# Patient Record
Sex: FEMALE | Race: WHITE | Employment: FULL TIME | URBAN - METROPOLITAN AREA
[De-identification: names, ages, dates, MRNs, and addresses within clinical notes are randomized per-mention and may not be internally consistent; named-entity substitution may affect disease eponyms.]

---

## 2019-12-17 ENCOUNTER — HOSPITAL ENCOUNTER (OUTPATIENT)
Dept: LAB | Age: 49
Discharge: HOME | End: 2019-12-17
Payer: OTHER GOVERNMENT

## 2019-12-17 ENCOUNTER — HOSPITAL ENCOUNTER (OUTPATIENT)
Dept: ULTRASOUND IMAGING | Age: 49
Discharge: HOME | End: 2019-12-17
Attending: SURGERY

## 2019-12-17 DIAGNOSIS — G89.29 CHRONIC PAIN OF RIGHT ANKLE: ICD-10-CM

## 2019-12-17 DIAGNOSIS — M25.471 RIGHT ANKLE SWELLING: ICD-10-CM

## 2019-12-17 DIAGNOSIS — M25.571 CHRONIC PAIN OF RIGHT ANKLE: ICD-10-CM

## 2019-12-17 PROBLEM — E66.01 OBESITY, MORBID (HCC): Status: ACTIVE | Noted: 2019-12-17

## 2019-12-17 LAB
EST. AVERAGE GLUCOSE BLD GHB EST-MCNC: 111 MG/DL
HBA1C MFR BLD: 5.5 %
TSH SERPL DL<=0.005 MIU/L-ACNC: 6.83 UIU/ML

## 2019-12-17 PROCEDURE — 80323 ALKALOIDS NOS: CPT

## 2019-12-17 PROCEDURE — 84443 ASSAY THYROID STIM HORMONE: CPT

## 2019-12-17 PROCEDURE — 83036 HEMOGLOBIN GLYCOSYLATED A1C: CPT

## 2019-12-17 PROCEDURE — 36415 COLL VENOUS BLD VENIPUNCTURE: CPT

## 2019-12-17 PROCEDURE — 82652 VIT D 1 25-DIHYDROXY: CPT

## 2019-12-18 LAB — 1,25(OH)2D3 SERPL-MCNC: 25.4 PG/ML (ref 19.9–79.3)

## 2019-12-24 LAB
COTININE SERPL-MCNC: NORMAL NG/ML
NICOTINE SERPL-MCNC: NORMAL NG/ML

## 2019-12-26 PROBLEM — M79.671 RIGHT FOOT PAIN: Status: ACTIVE | Noted: 2019-12-26

## 2019-12-26 PROBLEM — F32.0 MILD MAJOR DEPRESSION (HCC): Status: ACTIVE | Noted: 2019-12-26

## 2019-12-26 PROBLEM — G25.81 RESTLESS LEG SYNDROME: Status: ACTIVE | Noted: 2019-12-26

## 2019-12-26 PROBLEM — E03.9 HYPOTHYROIDISM: Status: ACTIVE | Noted: 2019-12-26

## 2020-01-21 ENCOUNTER — HOSPITAL ENCOUNTER (OUTPATIENT)
Dept: NUTRITION | Age: 50
Discharge: HOME | End: 2020-01-21
Payer: OTHER GOVERNMENT

## 2020-01-21 PROCEDURE — 97802 MEDICAL NUTRITION INDIV IN: CPT

## 2020-01-21 NOTE — PROGRESS NOTES
SWL ASSESSMENT    Nutrition Assessment:  Anthropometrics:               Ht: 56, wt: 268#, 168% IBW, 42 BMI    Co-morbidities: OA    Labs: Bariatrics Program to complete lab work. Macronutrient needs assessment  EER: Objective measure of EEN difficult to assess 2nd rapid wt loss. Total energy requirements based on predictive and objective measures of the SWL pt inaccurate and are not likely to be met for up to 24 months postoperative. EPR: 76-114g protein/d based on 1.0g-1.5g/kg IBW or 60-120g of HBV protein to minimize losses of LBM   CHO: Avoid simple sugars; maintain consistent carbohydrate intake throughout the day  H2O: 48-64oz/day via regular, small amounts to maintain hydration. Support:  Pt has told mom, . Reminded pt about [facility] support group and online support group. Motivation:  High. In a lot of pain in joints. Pt has tried Atkins, juicing, counted calories to keep weight down while a swimmer-- all without any permanent weight loss. Eating Habits:   Eating occasions/d: Varies depending on if she works or is off of work. No consistent pattern of eating  Main cook at home: No one- pt elects self  Restaurant/Fast Food Intake: 1 delivery meal/week-chili's, chinese, pizza; out to eat at [id number] per week;   Typical Beverage Consumption: juice-orange, apple, cranberry, guave, grape; leo coconut water, diet fresca, diet gingerale, bottled water while at work. 2% milk  Food Allergies: Anjana  Cultural Preferences: none  Diet Recall: On work day:  4PM: lean cuisine meal, or husbands/moms food- corned beef and cabbage, spaghetti with meat sauce and italian bread and green beans, meatloaf bread corn on cob, loaded baked potatoes with fried okra; fresca (sguar free)  2-3AM: sandwich or zaxbys salad-extra cheese, ranch, 90 second meal, cup of soup, yogurt pretzels, block cheese, mustard pretzels/cheese/apple, yogurt, grapefruit slices. Water.     On non work day:  Eat throughout the day- usually 1 meal and snacking throughout the day. Larger portions. Leftovers from dinner. Doritos. Will eat eggs. 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, night shift              Activity during day: none              Type of Housing: Apartment                          Number of people living in house and influence: Mom and - mom turned her diabetes around through diet and exercise. Exercise Assessment:               Exercise equipment at home: None              Gym Membership: [employer]. Medical:                           Pain or injuries (past and present): R ankle, R rotator cuff, cl in back, bilateral him replacements, artificial knee/kneecap                           Past surgeries related to mobility: 11(see above)  Current Exercise Routine: Gets 58028 steps on fitbit 5 days per week, swim 1 day per week on Sunday. States she will not be able to exercise for 5 weeks after upcoming ankle surgery. Assessment Exercise Goal: Begin exercising minimum 30 mins per day, beginning at 3 days per week once cleared following ankle surgery. Nutrition Diagnosis:  Morbid obesity R/T yoyo dieting as evidenced by BMI = 42 and 168 % IBW.       Nutrition Intervention:    Discussion of Advanced or related topics: Bariatric Surgery Diet-                           A. 3 meals/d and macronutrients                          B. Pre vs post op diet --to be discussed in future appointments                          C. Protein-first Diet and protein supplements --to be discussed in future appointments                          D. Fluid Requirements; > 64 oz/day  non-carbonated, non-caffeinated                                 E. MVI requirements --to be discussed in future appointments                          F. Mindful/Intuitive Eating  *Pt verbalizes understanding of information provided during this session. Pt notes a history of emotional eating, not exercising, not eating 3 meals per day, had limited nutrition knowledge prior to appointment today. Did not know fruit was a carb, was drinking juice, etc. Encouraged pt to identify healthy coping activities other than eating, discussed order of eating for life following surgery (protein, NSV, smart carbohydrates). Encouraged pt to track meals. Nutrition Monitoring and Evaluation:    Follow for understanding of pre and post-operative nutrition requirements.  Monitor for safe, supervised weight loss appropriate for gastric sleeve.  Follow for meeting protein and fluid requirements. Impression:                 Readiness to learn and change: Fair              Comprehension level: Fair              Anticipated compliance: Fair    RD feels pt will be a good SWL candidate once nutrition/exercise habits are in place. Pt understands changes that must occur prior to surgery to support SWL tool. RD feels as though pt will be able to adhere to post-operative instructions and plan of care.            Echo Hughes RD, LD

## 2020-01-26 RX ORDER — CEFAZOLIN SODIUM/WATER 2 G/20 ML
2 SYRINGE (ML) INTRAVENOUS ONCE
Status: CANCELLED | OUTPATIENT
Start: 2020-01-26 | End: 2020-01-26

## 2020-01-28 ENCOUNTER — ANESTHESIA EVENT (OUTPATIENT)
Dept: SURGERY | Age: 50
End: 2020-01-28
Payer: OTHER GOVERNMENT

## 2020-01-29 ENCOUNTER — HOSPITAL ENCOUNTER (OUTPATIENT)
Age: 50
Setting detail: OUTPATIENT SURGERY
Discharge: HOME | End: 2020-01-29
Attending: ORTHOPAEDIC SURGERY | Admitting: ORTHOPAEDIC SURGERY
Payer: OTHER GOVERNMENT

## 2020-01-29 ENCOUNTER — APPOINTMENT (OUTPATIENT)
Dept: GENERAL RADIOLOGY | Age: 50
End: 2020-01-29
Attending: ORTHOPAEDIC SURGERY
Payer: OTHER GOVERNMENT

## 2020-01-29 ENCOUNTER — ANESTHESIA (OUTPATIENT)
Dept: SURGERY | Age: 50
End: 2020-01-29
Payer: OTHER GOVERNMENT

## 2020-01-29 VITALS
SYSTOLIC BLOOD PRESSURE: 145 MMHG | WEIGHT: 284 LBS | BODY MASS INDEX: 43.18 KG/M2 | RESPIRATION RATE: 16 BRPM | DIASTOLIC BLOOD PRESSURE: 76 MMHG | HEART RATE: 72 BPM | TEMPERATURE: 98 F | OXYGEN SATURATION: 100 %

## 2020-01-29 DIAGNOSIS — M87.071 ASEPTIC NECROSIS OF RIGHT TALUS (HCC): Primary | ICD-10-CM

## 2020-01-29 LAB — HGB BLD-MCNC: 11.3 G/DL (ref 11.7–15.4)

## 2020-01-29 PROCEDURE — 77030020275 HC MISC ORTHOPEDIC: Performed by: ORTHOPAEDIC SURGERY

## 2020-01-29 PROCEDURE — 76210000020 HC REC RM PH II FIRST 0.5 HR: Performed by: ORTHOPAEDIC SURGERY

## 2020-01-29 PROCEDURE — C1721 AICD, DUAL CHAMBER: HCPCS | Performed by: ORTHOPAEDIC SURGERY

## 2020-01-29 PROCEDURE — 74011250637 HC RX REV CODE- 250/637: Performed by: ANESTHESIOLOGY

## 2020-01-29 PROCEDURE — 77030037713 HC CLOSR DEV INCIS ZIP STRY -B: Performed by: ORTHOPAEDIC SURGERY

## 2020-01-29 PROCEDURE — 74011000250 HC RX REV CODE- 250: Performed by: NURSE ANESTHETIST, CERTIFIED REGISTERED

## 2020-01-29 PROCEDURE — 77030020274 HC MISC IMPL ORTHOPEDIC: Performed by: ORTHOPAEDIC SURGERY

## 2020-01-29 PROCEDURE — 77030003602 HC NDL NRV BLK BBMI -B: Performed by: ANESTHESIOLOGY

## 2020-01-29 PROCEDURE — 77030000032 HC CUF TRNQT ZIMM -B: Performed by: ORTHOPAEDIC SURGERY

## 2020-01-29 PROCEDURE — 74011000250 HC RX REV CODE- 250: Performed by: ORTHOPAEDIC SURGERY

## 2020-01-29 PROCEDURE — 76210000063 HC OR PH I REC FIRST 0.5 HR: Performed by: ORTHOPAEDIC SURGERY

## 2020-01-29 PROCEDURE — 77030018836 HC SOL IRR NACL ICUM -A: Performed by: ORTHOPAEDIC SURGERY

## 2020-01-29 PROCEDURE — 76942 ECHO GUIDE FOR BIOPSY: CPT | Performed by: ORTHOPAEDIC SURGERY

## 2020-01-29 PROCEDURE — 85018 HEMOGLOBIN: CPT

## 2020-01-29 PROCEDURE — C1713 ANCHOR/SCREW BN/BN,TIS/BN: HCPCS | Performed by: ORTHOPAEDIC SURGERY

## 2020-01-29 PROCEDURE — 76060000035 HC ANESTHESIA 2 TO 2.5 HR: Performed by: ORTHOPAEDIC SURGERY

## 2020-01-29 PROCEDURE — 77030025281 HC SPLNT ORTHGLS 1 BSNM -B: Performed by: ORTHOPAEDIC SURGERY

## 2020-01-29 PROCEDURE — 76010000162 HC OR TIME 1.5 TO 2 HR INTENSV-TIER 1: Performed by: ORTHOPAEDIC SURGERY

## 2020-01-29 PROCEDURE — 76010010054 HC POST OP PAIN BLOCK: Performed by: ORTHOPAEDIC SURGERY

## 2020-01-29 PROCEDURE — 74011250636 HC RX REV CODE- 250/636: Performed by: ORTHOPAEDIC SURGERY

## 2020-01-29 PROCEDURE — 77030013921: Performed by: ORTHOPAEDIC SURGERY

## 2020-01-29 PROCEDURE — 77030002933 HC SUT MCRYL J&J -A: Performed by: ORTHOPAEDIC SURGERY

## 2020-01-29 PROCEDURE — 74011250636 HC RX REV CODE- 250/636: Performed by: ANESTHESIOLOGY

## 2020-01-29 PROCEDURE — 77030003748: Performed by: ORTHOPAEDIC SURGERY

## 2020-01-29 PROCEDURE — 77030002982 HC SUT POLYSRB J&J -A: Performed by: ORTHOPAEDIC SURGERY

## 2020-01-29 PROCEDURE — 74011250636 HC RX REV CODE- 250/636: Performed by: NURSE ANESTHETIST, CERTIFIED REGISTERED

## 2020-01-29 DEVICE — IMPLANTABLE DEVICE: Type: IMPLANTABLE DEVICE | Site: FOOT | Status: FUNCTIONAL

## 2020-01-29 RX ORDER — CEFAZOLIN SODIUM/WATER 2 G/20 ML
3 SYRINGE (ML) INTRAVENOUS
Status: COMPLETED | OUTPATIENT
Start: 2020-01-29 | End: 2020-01-29

## 2020-01-29 RX ORDER — HEPARIN 100 UNIT/ML
SYRINGE INTRAVENOUS AS NEEDED
Status: DISCONTINUED | OUTPATIENT
Start: 2020-01-29 | End: 2020-01-29 | Stop reason: HOSPADM

## 2020-01-29 RX ORDER — HYDROMORPHONE HYDROCHLORIDE 2 MG/ML
0.5 INJECTION, SOLUTION INTRAMUSCULAR; INTRAVENOUS; SUBCUTANEOUS
Status: DISCONTINUED | OUTPATIENT
Start: 2020-01-29 | End: 2020-01-29 | Stop reason: HOSPADM

## 2020-01-29 RX ORDER — ROPIVACAINE HYDROCHLORIDE 5 MG/ML
INJECTION, SOLUTION EPIDURAL; INFILTRATION; PERINEURAL
Status: COMPLETED | OUTPATIENT
Start: 2020-01-29 | End: 2020-01-29

## 2020-01-29 RX ORDER — MIDAZOLAM HYDROCHLORIDE 1 MG/ML
2 INJECTION, SOLUTION INTRAMUSCULAR; INTRAVENOUS
Status: COMPLETED | OUTPATIENT
Start: 2020-01-29 | End: 2020-01-29

## 2020-01-29 RX ORDER — SODIUM CHLORIDE, SODIUM LACTATE, POTASSIUM CHLORIDE, CALCIUM CHLORIDE 600; 310; 30; 20 MG/100ML; MG/100ML; MG/100ML; MG/100ML
100 INJECTION, SOLUTION INTRAVENOUS CONTINUOUS
Status: DISCONTINUED | OUTPATIENT
Start: 2020-01-29 | End: 2020-01-29 | Stop reason: HOSPADM

## 2020-01-29 RX ORDER — SODIUM CHLORIDE 0.9 % (FLUSH) 0.9 %
5-40 SYRINGE (ML) INJECTION EVERY 8 HOURS
Status: DISCONTINUED | OUTPATIENT
Start: 2020-01-29 | End: 2020-01-29 | Stop reason: HOSPADM

## 2020-01-29 RX ORDER — FENTANYL CITRATE 50 UG/ML
100 INJECTION, SOLUTION INTRAMUSCULAR; INTRAVENOUS ONCE
Status: COMPLETED | OUTPATIENT
Start: 2020-01-29 | End: 2020-01-29

## 2020-01-29 RX ORDER — PROPOFOL 10 MG/ML
INJECTION, EMULSION INTRAVENOUS AS NEEDED
Status: DISCONTINUED | OUTPATIENT
Start: 2020-01-29 | End: 2020-01-29 | Stop reason: HOSPADM

## 2020-01-29 RX ORDER — DEXAMETHASONE SODIUM PHOSPHATE 4 MG/ML
INJECTION, SOLUTION INTRA-ARTICULAR; INTRALESIONAL; INTRAMUSCULAR; INTRAVENOUS; SOFT TISSUE AS NEEDED
Status: DISCONTINUED | OUTPATIENT
Start: 2020-01-29 | End: 2020-01-29 | Stop reason: HOSPADM

## 2020-01-29 RX ORDER — SCOLOPAMINE TRANSDERMAL SYSTEM 1 MG/1
1 PATCH, EXTENDED RELEASE TRANSDERMAL ONCE
Status: DISCONTINUED | OUTPATIENT
Start: 2020-01-29 | End: 2020-01-29 | Stop reason: HOSPADM

## 2020-01-29 RX ORDER — OXYCODONE HYDROCHLORIDE 5 MG/1
10 TABLET ORAL
Status: DISCONTINUED | OUTPATIENT
Start: 2020-01-29 | End: 2020-01-29 | Stop reason: HOSPADM

## 2020-01-29 RX ORDER — ONDANSETRON 2 MG/ML
INJECTION INTRAMUSCULAR; INTRAVENOUS AS NEEDED
Status: DISCONTINUED | OUTPATIENT
Start: 2020-01-29 | End: 2020-01-29 | Stop reason: HOSPADM

## 2020-01-29 RX ORDER — LIDOCAINE HYDROCHLORIDE 20 MG/ML
INJECTION, SOLUTION EPIDURAL; INFILTRATION; INTRACAUDAL; PERINEURAL AS NEEDED
Status: DISCONTINUED | OUTPATIENT
Start: 2020-01-29 | End: 2020-01-29 | Stop reason: HOSPADM

## 2020-01-29 RX ORDER — PROPOFOL 10 MG/ML
INJECTION, EMULSION INTRAVENOUS
Status: DISCONTINUED | OUTPATIENT
Start: 2020-01-29 | End: 2020-01-29 | Stop reason: HOSPADM

## 2020-01-29 RX ORDER — SODIUM CHLORIDE 0.9 % (FLUSH) 0.9 %
5-40 SYRINGE (ML) INJECTION AS NEEDED
Status: DISCONTINUED | OUTPATIENT
Start: 2020-01-29 | End: 2020-01-29 | Stop reason: HOSPADM

## 2020-01-29 RX ORDER — LIDOCAINE HYDROCHLORIDE 10 MG/ML
0.3 INJECTION INFILTRATION; PERINEURAL ONCE
Status: DISCONTINUED | OUTPATIENT
Start: 2020-01-29 | End: 2020-01-29 | Stop reason: HOSPADM

## 2020-01-29 RX ADMIN — MEPIVACAINE HYDROCHLORIDE 10 ML: 20 INJECTION, SOLUTION EPIDURAL; INFILTRATION at 07:40

## 2020-01-29 RX ADMIN — SODIUM CHLORIDE, SODIUM LACTATE, POTASSIUM CHLORIDE, AND CALCIUM CHLORIDE 100 ML/HR: 600; 310; 30; 20 INJECTION, SOLUTION INTRAVENOUS at 06:28

## 2020-01-29 RX ADMIN — DEXAMETHASONE SODIUM PHOSPHATE 10 MG: 4 INJECTION, SOLUTION INTRAMUSCULAR; INTRAVENOUS at 08:21

## 2020-01-29 RX ADMIN — PROPOFOL 40 MG: 10 INJECTION, EMULSION INTRAVENOUS at 08:18

## 2020-01-29 RX ADMIN — PROPOFOL 160 MCG/KG/MIN: 10 INJECTION, EMULSION INTRAVENOUS at 08:18

## 2020-01-29 RX ADMIN — Medication 3 G: at 08:21

## 2020-01-29 RX ADMIN — FENTANYL CITRATE 100 MCG: 50 INJECTION, SOLUTION INTRAMUSCULAR; INTRAVENOUS at 07:34

## 2020-01-29 RX ADMIN — ROPIVACAINE HYDROCHLORIDE 20 ML: 150 INJECTION, SOLUTION EPIDURAL; INFILTRATION; PERINEURAL at 07:40

## 2020-01-29 RX ADMIN — ONDANSETRON 4 MG: 2 INJECTION INTRAMUSCULAR; INTRAVENOUS at 10:06

## 2020-01-29 RX ADMIN — ROPIVACAINE HYDROCHLORIDE 10 ML: 150 INJECTION, SOLUTION EPIDURAL; INFILTRATION; PERINEURAL at 07:42

## 2020-01-29 RX ADMIN — MIDAZOLAM 2 MG: 1 INJECTION INTRAMUSCULAR; INTRAVENOUS at 07:34

## 2020-01-29 RX ADMIN — LIDOCAINE HYDROCHLORIDE 60 MG: 20 INJECTION, SOLUTION EPIDURAL; INFILTRATION; INTRACAUDAL; PERINEURAL at 08:13

## 2020-01-29 RX ADMIN — ONDANSETRON 4 MG: 2 INJECTION INTRAMUSCULAR; INTRAVENOUS at 08:18

## 2020-01-29 RX ADMIN — MEPIVACAINE HYDROCHLORIDE 10 ML: 20 INJECTION, SOLUTION EPIDURAL; INFILTRATION at 07:42

## 2020-01-29 NOTE — ANESTHESIA POSTPROCEDURE EVALUATION
Procedure(s):  RIGHT ACHILLES LENGTHENING  RIGHT ANKLE AND SUBTALAR FUSIONS W/ BONE MARROW ASPIRATE.    total IV anesthesia    Anesthesia Post Evaluation      Multimodal analgesia: multimodal analgesia used between 6 hours prior to anesthesia start to PACU discharge  Patient location during evaluation: PACU  Patient participation: complete - patient participated  Level of consciousness: awake and alert  Pain management: adequate  Airway patency: patent  Anesthetic complications: no  Cardiovascular status: acceptable  Respiratory status: acceptable  Hydration status: acceptable  Post anesthesia nausea and vomiting:  none      Vitals Value Taken Time   /74 1/29/2020 10:30 AM   Temp 36.4 °C (97.5 °F) 1/29/2020 10:13 AM   Pulse 67 1/29/2020 10:33 AM   Resp 16 1/29/2020 10:25 AM   SpO2 94 % 1/29/2020 10:33 AM   Vitals shown include unvalidated device data.

## 2020-01-29 NOTE — ANESTHESIA PREPROCEDURE EVALUATION
Relevant Problems   No relevant active problems       Anesthetic History     PONV     Comments: Bradycardia with anesthesia     Review of Systems / Medical History  Patient summary reviewed and pertinent labs reviewed    Pulmonary                   Neuro/Psych         Psychiatric history     Cardiovascular                  Exercise tolerance: >4 METS     GI/Hepatic/Renal           PUD (history)     Endo/Other        Morbid obesity     Other Findings              Physical Exam    Airway  Mallampati: II  TM Distance: 4 - 6 cm  Neck ROM: normal range of motion   Mouth opening: Normal     Cardiovascular    Rhythm: regular  Rate: normal         Dental  No notable dental hx       Pulmonary  Breath sounds clear to auscultation               Abdominal         Other Findings            Anesthetic Plan    ASA: 3  Anesthesia type: total IV anesthesia      Post-op pain plan if not by surgeon: peripheral nerve block single    Induction: Intravenous  Anesthetic plan and risks discussed with: Patient and Family

## 2020-01-29 NOTE — ANESTHESIA PROCEDURE NOTES
Popliteal block with ultrasound    Start time: 1/29/2020 7:34 AM  End time: 1/29/2020 7:40 AM  Performed by: Nayana Vela MD  Authorized by: Nayana Vela MD       Pre-procedure:    Indications: at surgeon's request and post-op pain management    Preanesthetic Checklist: patient identified, risks and benefits discussed, site marked, timeout performed, anesthesia consent given and patient being monitored    Timeout Time: 07:34          Block Type:   Block Type:  Popliteal  Laterality:  Right  Monitoring:  Continuous pulse ox, frequent vital sign checks, heart rate, oxygen and responsive to questions  Injection Technique:  Single shot  Procedures: ultrasound guided    Prep: chlorhexidine    Location:  Lower thigh  Needle Type:  Stimuplex  Needle Gauge:  20 G  Needle Localization:  Ultrasound guidance    Assessment:  Number of attempts:  1  Injection Assessment:  Incremental injection every 5 mL, local visualized surrounding nerve on ultrasound, negative aspiration for blood, no intravascular symptoms, no paresthesia and ultrasound image on chart  Patient tolerance:  Patient tolerated the procedure well with no immediate complications

## 2020-01-29 NOTE — ANESTHESIA PROCEDURE NOTES
Saphenous    Start time: 1/29/2020 7:40 AM  End time: 1/29/2020 7:42 AM  Performed by: Qing Partida MD  Authorized by: Qing Partida MD       Pre-procedure: Indications: at surgeon's request and post-op pain management    Preanesthetic Checklist: patient identified, risks and benefits discussed, site marked, timeout performed, anesthesia consent given and patient being monitored    Timeout Time: 07:40          Block Type:   Block Type:  Saphenous  Laterality:  Right  Monitoring:  Responsive to questions, oxygen, continuous pulse ox, frequent vital sign checks and heart rate  Injection Technique:  Single shot  Patient Position: supine  Prep: chlorhexidine    Location:  Cephalad tibia  Catheter size: 25g.   Needle Localization:  Anatomical landmarks and infiltration    Assessment:  Number of attempts:  1  Injection Assessment:  Negative aspiration for blood, no intravascular symptoms, no paresthesia and incremental injection every 5 mL  Patient tolerance:  Patient tolerated the procedure well with no immediate complications

## 2020-01-29 NOTE — DISCHARGE INSTRUCTIONS
INSTRUCTIONS FOLLOWING FOOT SURGERY    ACTIVITY  Elevate foot. No Ice  Let the office know if dressing gets saturated with water. No weight bearing. Use crutches or knee walker until seen in follow up appointment. Can use wheelchair until knee walker comes in tomorrow. Don't put anything into the splint to relieve itching etc.   Take one 325mg aspirin daily if okay with your medical doctor and you have no GI ulcer. Get up and out of bed frequently. While in bed move the legs as much as possible. DRESSING CARE Keep dry and in place until follow up appointment      DIET  Day of Surgery: Clear liquids until no nausea or vomiting; then light, bland diet (Baked chicken, plain rice, grits, scrambled egg, toast). Nothing Greasy, fried or spicy today  Advance to regular diet on second day, unless your doctor orders otherwise. PAIN  Take pain medications as directed by your doctor. Call your doctor if pain is NOT relieved by medication. PAIN MED SIDE EFFECTS  Constipation: Lots of fluids, try prune juice, then OTC stool softeners if necessary  Nausea: Take medication with food. CALL YOUR DOCTOR IF YOU HAVE  Excessive bleeding that does not stop after holding mild pressure over the area. Temperature of 101 degrees or above. Redness, excessive swelling or bruising, and/or green or yellow, smelly discharge from incision. Loss of sensation - cold, white or blue toes. AFTER ANESTHESIA  For the first 24 hours and while taking narcotics for pain: DO NOT Drive, Drink Alcoholic beverages, or make important Decisions. Be aware of dizziness following anesthesia and while taking pain medication. Preventing Infection at Home  We care about preventing infection and avoiding the spread of germs - not only when you are in the hospital but also when you return home.  When you return home from the hospital, its important to take the following steps to help prevent infection and avoid spreading germs that could infect you and others. Ask everyone in your home to follow these guidelines, too. Clean Your Hands  · Clean your hands whenever your hands are visibly dirty, before you eat, before or after touching your mouth, nose or eyes, and before preparing food. Clean them after contact with body fluids, using the restroom, touching animals or changing diapers. · When washing hands, wet them with warm water and work up a lather. Rub hands for at least 15 seconds, then rinse them and pat them dry with a clean towel or paper towel. · When using hand sanitizers, it should take about 15 seconds to rub your hands dry. If not, you probably didnt apply enough . Cover Your Sneeze or Cough  Germs are released into the air whenever you sneeze or cough. To prevent the spread of infection:  · Turn away from other people before coughing or sneezing. · Cover your mouth or nose with a tissue when you cough or sneeze. Put the tissue in the trash. · If you dont have a tissue, cough or sneeze into your upper sleeve, not your hands. · Always clean your hands after coughing or sneezing. Care for Wounds  Your skin is your bodys first line of defense against germs, but an open wound leaves an easy way for germs to enter your body. To prevent infection:  · Clean your hands before and after changing wound dressings, and wear gloves to change dressings if recommended by your doctor. · Take special care with IV lines or other devices inserted into the body. If you must touch them, clean your hands first.  · Follow any specific instructions from your doctor to care for your wounds. Contact your doctor if you experience any signs of infection, such as fever or increased redness at the surgical or wound site. Keep a Clean Home  · Clean or wipe commonly touched hard surfaces like door handles, sinks, tabletops, phones and TV remotes. · Use products labeled disinfectant to kill harmful bacteria and viruses.   · Use a clean cloth or paper towel to clean and dry surfaces. Wiping surfaces with a dirty dishcloth, sponge or towel will only spread germs. · Never share toothbrushes, sood, drinking glasses, utensils, razor blades, face cloths or bath towels to avoid spreading germs. · Be sure that the linens that you sleep on are clean. · Keep pets away from wounds and wash your hands after touching pets, their toys or bedding. We care about you and your health. Remember, preventing infections is a team effort between you, your family, friends and health care providers. DISCHARGE SUMMARY from Nurse    PATIENT INSTRUCTIONS:    After general anesthesia or intravenous sedation, for 24 hours or while taking prescription Narcotics:  · Limit your activities  · Do not drive and operate hazardous machinery  · Do not make important personal or business decisions  · Do  not drink alcoholic beverages  · If you have not urinated within 8 hours after discharge, please contact your surgeon on call. *  Please give a list of your current medications to your Primary Care Provider. *  Please update this list whenever your medications are discontinued, doses are      changed, or new medications (including over-the-counter products) are added. *  Please carry medication information at all times in case of emergency situations. These are general instructions for a healthy lifestyle:    No smoking/ No tobacco products/ Avoid exposure to second hand smoke    Surgeon General's Warning:  Quitting smoking now greatly reduces serious risk to your health.     Obesity, smoking, and sedentary lifestyle greatly increases your risk for illness    A healthy diet, regular physical exercise & weight monitoring are important for maintaining a healthy lifestyle    You may be retaining fluid if you have a history of heart failure or if you experience any of the following symptoms:  Weight gain of 3 pounds or more overnight or 5 pounds in a week, increased swelling in our hands or feet or shortness of breath while lying flat in bed. Please call your doctor as soon as you notice any of these symptoms; do not wait until your next office visit. Recognize signs and symptoms of STROKE:    F-face looks uneven    A-arms unable to move or move unevenly    S-speech slurred or non-existent    T-time-call 911 as soon as signs and symptoms begin-DO NOT go       Back to bed or wait to see if you get better-TIME IS BRAIN.

## 2020-01-30 RX ORDER — HYDROMORPHONE HYDROCHLORIDE 2 MG/1
2 TABLET ORAL
Qty: 20 TAB | Refills: 0 | Status: SHIPPED | OUTPATIENT
Start: 2020-01-30 | End: 2020-02-02

## 2020-01-30 NOTE — OP NOTES
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 PROCEDURES PERFORMED:  1. Right triple hemisection Achilles lengthening, 64372.  2.  Right open ankle arthrodesis, 19090.  3.  Right subtalar joint arthrodesis, 81382.  4.  Right calcaneal autograft harvest with bone marrow aspirate, . SURGEON:  Gilbert Collet, MD        ANESTHESIA:  Popliteal block with monitored anesthesia care. ESTIMATED BLOOD LOSS:  Minimal.    TOURNIQUET TIME:  80 minutes at 250 mmHg. ANTIBIOTIC PROPHYLAXIS:  Ancef given prior to procedure. INDICATIONS FOR PROCEDURE:  The patient is a 55-year-old white female with severe varus hindfoot ankle arthritis and subtalar joint arthritis, status post previous talus fracture, who presents today for operative treatment. Risks and benefits of the procedure including, but not limited to, risk of anesthetic complications, myocardial infarction, stroke, and death; and surgical complications including damage to nerves and blood vessels, risk of infection, incomplete pain relief, risk of malunion, risk of nonunion, risk of lifetime bracing, and need for conversion later to ankle arthroplasty had been discussed with the patient. She understands the risks and wishes to proceed with surgery at this time. DETAILS OF PROCEDURE:  The patient's operative site was marked with indelible ink in the preop holding area. A block was placed by the Department of Anesthesia. The patient was brought to the operating room and placed supine.   After preop surgical time-out, the right lower extremity was identified as the surgical site, prepped and draped in a standard sterile fashion using ChloraPrep solution. A triple hemisection Achilles lengthening was performed through three small stab incisions of the heel. At that time, a bone marrow aspirate needle was then placed into the lateral calcaneal wall. Bone marrow aspirate was obtained and later spun down in a centrifuge and the stem cells were then used in both the subtalar and the tibiotalar joint. Lateral approach to the sinus tarsi was then performed at that time. Underlying subtalar joint was identified and prepared using a curette, followed by drill bit and osteotomes and bone marrow aspirate was placed at this time. The wounds were irrigated and closed using Monocryl and ZipLine sutures. An anterior approach to the ankle was then performed at that time, taking care to protect the neurovascular bundle. The underlying tibiotalar joint was identified and was prepared using a curette, followed by drill bit and osteotome. It was brought back into desired neutral alignment and an Arthrex anterior ankle fusion plate was affixed using appropriate length locking and nonlocking screws. The wounds were irrigated and closed using Monocryl and ZipLine sutures. A sterile dressing was then applied, followed by well-padded posterior splint. Anesthesia was discontinued. The patient was transferred back to the recovery bed and taken to the recovery room in satisfactory condition. She appeared to tolerate the procedure well. There were no apparent general or anesthetic complications. All needle and sponge counts correct.       MD DELLA Lucas/[email or web address]  D:  01/29/2020 21:08  T:  01/30/2020 3:38  JOB #:  [id number]

## 2020-02-11 ENCOUNTER — APPOINTMENT (OUTPATIENT)
Dept: NUTRITION | Age: 50
End: 2020-02-11

## 2020-03-30 ENCOUNTER — TELEPHONE (OUTPATIENT)
Dept: NUTRITION | Age: 50
End: 2020-03-30

## 2020-05-18 ENCOUNTER — HOSPITAL ENCOUNTER (OUTPATIENT)
Dept: GENERAL RADIOLOGY | Age: 50
Discharge: HOME | End: 2020-05-18
Attending: NURSE PRACTITIONER
Payer: OTHER GOVERNMENT

## 2020-05-18 DIAGNOSIS — Z87.19 H/O ESOPHAGEAL REFLUX: ICD-10-CM

## 2020-05-18 PROCEDURE — 74240 X-RAY XM UPR GI TRC 1CNTRST: CPT

## 2020-05-18 PROCEDURE — 74011000250 HC RX REV CODE- 250: Performed by: NURSE PRACTITIONER

## 2020-05-18 PROCEDURE — 74011000255 HC RX REV CODE- 255: Performed by: NURSE PRACTITIONER

## 2020-05-18 RX ADMIN — ANTACID/ANTIFLATULENT 4 G: 380; 550; 10; 10 GRANULE, EFFERVESCENT ORAL at 08:56

## 2020-05-18 RX ADMIN — BARIUM SULFATE 55 ML: 0.6 SUSPENSION ORAL at 08:56

## 2020-05-18 RX ADMIN — BARIUM SULFATE 135 ML: 980 POWDER, FOR SUSPENSION ORAL at 08:55

## 2020-06-15 NOTE — PROGRESS NOTES
Per Edwin David at Dr. Matt Us office- Pt lives in [facility] and had COVID-19 completed in [facility] last Wednesday. Pt instructed to bring paper copy of results for Manometry test tomorrow.

## 2020-06-16 ENCOUNTER — HOSPITAL ENCOUNTER (OUTPATIENT)
Age: 50
Setting detail: OUTPATIENT SURGERY
Discharge: HOME | End: 2020-06-16
Attending: SURGERY | Admitting: SURGERY
Payer: OTHER GOVERNMENT

## 2020-06-16 PROCEDURE — 74011000250 HC RX REV CODE- 250: Performed by: SURGERY

## 2020-06-16 PROCEDURE — 91010 ESOPHAGUS MOTILITY STUDY: CPT | Performed by: SURGERY

## 2020-06-16 RX ORDER — LIDOCAINE HYDROCHLORIDE 20 MG/ML
15 SOLUTION OROPHARYNGEAL AS NEEDED
Status: DISCONTINUED | OUTPATIENT
Start: 2020-06-16 | End: 2020-06-16 | Stop reason: HOSPADM

## 2020-06-16 RX ORDER — LORAZEPAM 1 MG/1
1 TABLET ORAL ONCE
Status: DISCONTINUED | OUTPATIENT
Start: 2020-06-16 | End: 2020-06-16

## 2020-06-16 RX ADMIN — LIDOCAINE HYDROCHLORIDE 15 ML: 20 SOLUTION ORAL; TOPICAL at 08:00

## 2020-06-16 NOTE — PROGRESS NOTES
Patient unable to pass probe down, attempted X 4. Staff had difficulty with the nasal intubation. When staff did get probe through the nasal cavity, blood noted in nostril. When staff proceeded to push probe past back of throat, patient started vomiting uncontrollably and asked staff to remove probe, that she could not do the test. Staff removed probe.  Blood noted to probe after removal. Will inform Dr. Bandar Díaz that patient did not complete test.

## 2020-06-17 ENCOUNTER — HOSPITAL ENCOUNTER (OUTPATIENT)
Dept: SURGERY | Age: 50
Discharge: HOME | End: 2020-06-17
Payer: OTHER GOVERNMENT

## 2020-06-17 VITALS
OXYGEN SATURATION: 97 % | HEIGHT: 67 IN | RESPIRATION RATE: 20 BRPM | TEMPERATURE: 98.1 F | WEIGHT: 293 LBS | BODY MASS INDEX: 45.99 KG/M2 | HEART RATE: 81 BPM | SYSTOLIC BLOOD PRESSURE: 155 MMHG | DIASTOLIC BLOOD PRESSURE: 74 MMHG

## 2020-06-17 LAB
ALBUMIN SERPL-MCNC: 3.8 G/DL (ref 3.5–5)
ALBUMIN/GLOB SERPL: 1 {RATIO} (ref 1.2–3.5)
ALP SERPL-CCNC: 115 U/L (ref 50–136)
ALT SERPL-CCNC: 30 U/L (ref 12–65)
ANION GAP SERPL CALC-SCNC: 7 MMOL/L (ref 7–16)
AST SERPL-CCNC: 21 U/L (ref 15–37)
BILIRUB SERPL-MCNC: 0.3 MG/DL (ref 0.2–1.1)
BUN SERPL-MCNC: 18 MG/DL (ref 6–23)
CALCIUM SERPL-MCNC: 8.9 MG/DL (ref 8.3–10.4)
CHLORIDE SERPL-SCNC: 107 MMOL/L (ref 98–107)
CO2 SERPL-SCNC: 25 MMOL/L (ref 21–32)
CREAT SERPL-MCNC: 0.99 MG/DL (ref 0.6–1)
ERYTHROCYTE [DISTWIDTH] IN BLOOD BY AUTOMATED COUNT: 16.5 % (ref 11.9–14.6)
EST. AVERAGE GLUCOSE BLD GHB EST-MCNC: 108 MG/DL
GLOBULIN SER CALC-MCNC: 4 G/DL (ref 2.3–3.5)
GLUCOSE SERPL-MCNC: 149 MG/DL (ref 65–100)
HBA1C MFR BLD: 5.4 %
HCT VFR BLD AUTO: 36.7 % (ref 35.8–46.3)
HGB BLD-MCNC: 11.1 G/DL (ref 11.7–15.4)
MCH RBC QN AUTO: 25.3 PG (ref 26.1–32.9)
MCHC RBC AUTO-ENTMCNC: 30.2 G/DL (ref 31.4–35)
MCV RBC AUTO: 83.6 FL (ref 79.6–97.8)
NRBC # BLD: 0 K/UL (ref 0–0.2)
PLATELET # BLD AUTO: 298 K/UL (ref 150–450)
PMV BLD AUTO: 10.2 FL (ref 9.4–12.3)
POTASSIUM SERPL-SCNC: 4 MMOL/L (ref 3.5–5.1)
PROT SERPL-MCNC: 7.8 G/DL (ref 6.3–8.2)
RBC # BLD AUTO: 4.39 M/UL (ref 4.05–5.2)
SODIUM SERPL-SCNC: 139 MMOL/L (ref 136–145)
WBC # BLD AUTO: 7.2 K/UL (ref 4.3–11.1)

## 2020-06-17 PROCEDURE — 36415 COLL VENOUS BLD VENIPUNCTURE: CPT

## 2020-06-17 PROCEDURE — 83036 HEMOGLOBIN GLYCOSYLATED A1C: CPT

## 2020-06-17 PROCEDURE — 85027 COMPLETE CBC AUTOMATED: CPT

## 2020-06-17 PROCEDURE — 80053 COMPREHEN METABOLIC PANEL: CPT

## 2020-06-17 NOTE — PERIOP NOTES
SURGICAL WEIGHT LOSS Enhanced Recovery After Surgery: diabetic and non-diabetic patients      The night before surgery 6/28/20, drink 1 bottles of the Ensure Pre-Surgery drink. The morning of surgery 6/29/20, drink 1/2 bottle of the Ensure Pre-Surgery drink while on your way to the hospital. Drink this over 5-10 minutes. Drink nothing else after drinking the pre-surgical drink the morning of surgery. Bring your patient handbook with you to the hospital.        Things to Remember:      1. You will be up in a chair the evening of surgery and sipping on clear liquids, once released by your surgeon. 2. Beginning the day of surgery, you will be out of the bed as able, once released by your hospital team. We encourage you to be sitting up in a chair, walking in the powers, doing exercises as advised by physical therapy, etc.       3. You will be given scheduled non-narcotic pain medication to help keep your pain under control. You will have stronger pain medication ordered for break through pain. 4. All of these measures are geared toward improving your overall surgical recovery and   decreasing the risk of complications.

## 2020-06-17 NOTE — PERIOP NOTES
Patient verified name and     Order for consent NOT found in EHR, unable to confirm case posting; patient verified. Type 2 surgery, PAT walk-in assessment complete. Labs per surgeon: cbc, cmp, hemoglobin a1c; results pending. T&S DOS; order signed and held in EHR. Labs per anesthesia protocol: All required lab work included in surgeon's orders. EKG: None per anesthesia protocol. Patient informed a Covid swab is required 7 days prior to surgery. The testing center is located at the [facility] [address] [place]. For questions or concerns the patient is advised to call [phone]. An appointment is required and the testing clinic is closed from  for lunch and on weekends. Patient states she will have her COVID swab completed in [facility]. Patient instructed to fax results to [phone]. Charge nurse to /[facility] approved surgical skin cleanser and instructions given per hospital policy. Patient provided with and instructed on educational handouts including Guide to Surgery, Pain Management, Hand Hygiene, Blood Transfusion Education, and [facility] Brochure. Patient answered medical/surgical history questions at their best of ability. All prior to admission medications documented in [facility]. Original medication prescription bottle NOT visualized during patient appointment. Patient instructed to hold all vitamins, supplements, herbals 7 days prior to surgery, NSAIDS/ASA 5 days prior to surgery, and Tylenol (Acetaminophen) 24 hours prior to surgery. Patient teach back successful and patient demonstrates knowledge of instructions.

## 2020-06-17 NOTE — PERIOP NOTES
PLEASE CONTINUE TAKING ALL PRESCRIPTION MEDICATIONS UP TO THE DAY OF SURGERY UNLESS OTHERWISE DIRECTED BELOW. DISCONTINUE all vitamins, herbals and supplements 7 days prior to surgery. DISCONTINUE Non-Steriodal Anti-Inflammatory (NSAIDS) such as Advil, Ibuprofen, and Aleve 5 days prior to surgery. Home Medications to HOLD      All vitamins, supplements, and herbals stop 7 days prior to surgery   All NSAIDs and Aspirin, such as Advil, Aleve, Ibuprofen, Diclofenac, Naproxen, etc. Stop 5 days prior to surgery. Hold Tylenol (Acetaminophen) 24 hours prior to surgery. Home Medications to take  the day of surgery   Oxycodone if needed, Aloha Brandon if needed         Comments   Please bring bottle Xtampza and incentive spirometer to the hospital on the day of surgery. Please do not bring home medications with you on the day of surgery unless otherwise directed by your nurse. If you are instructed to bring home medications, please give them to your nurse as they will be administered by the nursing staff. If you have any questions, please call [place] [phone] or [facility] [phone]. Copy of above instructions given to patient.

## 2020-06-17 NOTE — PERIOP NOTES
The below lab results are within anesthesia limits, no further action is required. Recent Results (from the past 12 hour(s))   CBC W/O DIFF    Collection Time: 06/17/20  8:50 AM   Result Value Ref Range    WBC 7.2 4.3 - 11.1 K/uL    RBC 4.39 4.05 - 5.2 M/uL    HGB 11.1 (L) 11.7 - 15.4 g/dL    HCT 36.7 35.8 - 46.3 %    MCV 83.6 79.6 - 97.8 FL    MCH 25.3 (L) 26.1 - 32.9 PG    MCHC 30.2 (L) 31.4 - 35.0 g/dL    RDW 16.5 (H) 11.9 - 14.6 %    PLATELET 279 706 - 623 K/uL    MPV 10.2 9.4 - 12.3 FL    ABSOLUTE NRBC 0.00 0.0 - 0.2 K/uL   METABOLIC PANEL, COMPREHENSIVE    Collection Time: 06/17/20  8:50 AM   Result Value Ref Range    Sodium 139 136 - 145 mmol/L    Potassium 4.0 3.5 - 5.1 mmol/L    Chloride 107 98 - 107 mmol/L    CO2 25 21 - 32 mmol/L    Anion gap 7 7 - 16 mmol/L    Glucose 149 (H) 65 - 100 mg/dL    BUN 18 6 - 23 MG/DL    Creatinine 0.99 0.6 - 1.0 MG/DL    GFR est AA >60 >60 ml/min/1.73m2    GFR est non-AA >60 >60 ml/min/1.73m2    Calcium 8.9 8.3 - 10.4 MG/DL    Bilirubin, total 0.3 0.2 - 1.1 MG/DL    ALT (SGPT) 30 12 - 65 U/L    AST (SGOT) 21 15 - 37 U/L    Alk.  phosphatase 115 50 - 136 U/L    Protein, total 7.8 6.3 - 8.2 g/dL    Albumin 3.8 3.5 - 5.0 g/dL    Globulin 4.0 (H) 2.3 - 3.5 g/dL    A-G Ratio 1.0 (L) 1.2 - 3.5     HEMOGLOBIN A1C WITH EAG    Collection Time: 06/17/20  8:53 AM   Result Value Ref Range    Hemoglobin A1c 5.4 %    Est. average glucose 108 mg/dL

## 2020-06-23 ENCOUNTER — HOSPITAL ENCOUNTER (OUTPATIENT)
Age: 50
Setting detail: OUTPATIENT SURGERY
Discharge: HOME | End: 2020-06-23
Attending: SURGERY | Admitting: SURGERY
Payer: OTHER GOVERNMENT

## 2020-06-23 PROCEDURE — 91010 ESOPHAGUS MOTILITY STUDY: CPT | Performed by: SURGERY

## 2020-06-23 PROCEDURE — 74011000250 HC RX REV CODE- 250: Performed by: SURGERY

## 2020-06-23 RX ORDER — LIDOCAINE HYDROCHLORIDE 20 MG/ML
15 SOLUTION OROPHARYNGEAL AS NEEDED
Status: DISCONTINUED | OUTPATIENT
Start: 2020-06-23 | End: 2020-06-23 | Stop reason: HOSPADM

## 2020-06-23 RX ORDER — LORAZEPAM 1 MG/1
1 TABLET ORAL ONCE
Status: DISCONTINUED | OUTPATIENT
Start: 2020-06-23 | End: 2020-06-23 | Stop reason: HOSPADM

## 2020-06-23 RX ADMIN — BENZOCAINE: 220 SPRAY, METERED PERIODONTAL at 12:50

## 2020-06-23 RX ADMIN — LIDOCAINE HYDROCHLORIDE 15 ML: 20 SOLUTION ORAL; TOPICAL at 11:51

## 2020-06-24 NOTE — PROCEDURES
[facility]  PROCEDURE NOTE    Name:  Vilma Chamberlain  MR#:  [id number]  :  1970  ACCOUNT #:  [de-identified]  DATE OF SERVICE:  2020    PREOPERATIVE DIAGNOSIS:  Bariatric surgery workup. POSTOPERATIVE DIAGNOSIS:  Grossly abnormal motility - see below. PROCEDURE PERFORMED:  High-resolution impedance manometry. SURGEON:  Morena Lazar. Kalie Neely MD    PROCEDURE:  After obtaining informed consent, the patient underwent nasal intubation. The lower esophageal sphincter was located between 38.4 and 41.3 cm. There was a 3.2 cm hiatal hernia. The lower esophageal sphincter pressure was virtually nonexistent, averaging around 5 mm with the IRP slightly higher. The motility on 9/10 wet swallows was virtually absent with DCI averaging 80. There was only one normal swallow out of 10 and the DCI was just slightly above normal limits at 533. Viscous and liquid swallows made complete transit in 30% of 10 swallows each. This was by impedance data. DISPOSITION:  This is a grossly abnormal study with no significant contractility of the body of the esophagus and a lower esophageal sphincter pressure well below normal.  There was a hiatal hernia present as well. DISPOSITION:  Further followup per Dr. Kristina Toussaint.     Kasie Manrique MD      [id number]  D:  2020 9:11  T:  2020 10:10  JOB #:  [id number]  CC:  MD Kristina Purdy MD
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
## 2020-06-26 NOTE — PERIOP NOTES
No COVID test results received via fax as yet. Procedure scheduled Monday, June 29th. Noted in \"media\" section of chart review- scanned in document from [place] dated 6/9/2020. Covid test results \"not detected\". However, 6/9/2020 test results > 10 days of scheduled procedure.

## 2020-06-27 NOTE — PERIOP NOTES
Patient stating COVID swab performed at [facility] in [facility] on 06/26 at [phone] stating she was told it would result in 2 days.

## 2020-06-28 ENCOUNTER — ANESTHESIA EVENT (OUTPATIENT)
Dept: SURGERY | Age: 50
DRG: 621 | End: 2020-06-28
Payer: OTHER GOVERNMENT

## 2020-06-29 ENCOUNTER — ANESTHESIA (OUTPATIENT)
Dept: SURGERY | Age: 50
DRG: 621 | End: 2020-06-29
Payer: OTHER GOVERNMENT

## 2020-06-29 ENCOUNTER — HOSPITAL ENCOUNTER (INPATIENT)
Age: 50
LOS: 2 days | Discharge: HOME | DRG: 621 | End: 2020-07-01
Attending: SURGERY | Admitting: SURGERY
Payer: OTHER GOVERNMENT

## 2020-06-29 DIAGNOSIS — E66.01 OBESITY, MORBID (HCC): ICD-10-CM

## 2020-06-29 LAB
ABO + RH BLD: NORMAL
BLOOD GROUP ANTIBODIES SERPL: NORMAL
HCG UR QL: NEGATIVE
SPECIMEN EXP DATE BLD: NORMAL

## 2020-06-29 PROCEDURE — 74011250636 HC RX REV CODE- 250/636: Performed by: ANESTHESIOLOGY

## 2020-06-29 PROCEDURE — 77030039425 HC BLD LARYNG TRULITE DISP TELE -A: Performed by: ANESTHESIOLOGY

## 2020-06-29 PROCEDURE — 74011250637 HC RX REV CODE- 250/637: Performed by: ANESTHESIOLOGY

## 2020-06-29 PROCEDURE — 43235 EGD DIAGNOSTIC BRUSH WASH: CPT | Performed by: SURGERY

## 2020-06-29 PROCEDURE — 74011000250 HC RX REV CODE- 250: Performed by: NURSE ANESTHETIST, CERTIFIED REGISTERED

## 2020-06-29 PROCEDURE — 43644 LAP GASTRIC BYPASS/ROUX-EN-Y: CPT | Performed by: SURGERY

## 2020-06-29 PROCEDURE — 77030008756 HC TU IRR SUC STRY -B: Performed by: SURGERY

## 2020-06-29 PROCEDURE — 74011250637 HC RX REV CODE- 250/637: Performed by: NURSE PRACTITIONER

## 2020-06-29 PROCEDURE — 77030031139 HC SUT VCRL2 J&J -A: Performed by: SURGERY

## 2020-06-29 PROCEDURE — 74011250636 HC RX REV CODE- 250/636: Performed by: NURSE ANESTHETIST, CERTIFIED REGISTERED

## 2020-06-29 PROCEDURE — 94760 N-INVAS EAR/PLS OXIMETRY 1: CPT

## 2020-06-29 PROCEDURE — 74011250636 HC RX REV CODE- 250/636: Performed by: NURSE PRACTITIONER

## 2020-06-29 PROCEDURE — 77030013252 HC APPL DUPLOSPRY BAXT -B: Performed by: SURGERY

## 2020-06-29 PROCEDURE — 77030034154 HC SHR COAG HARM ACE J&J -F: Performed by: SURGERY

## 2020-06-29 PROCEDURE — 74011250636 HC RX REV CODE- 250/636

## 2020-06-29 PROCEDURE — 77030040361 HC SLV COMPR DVT MDII -B: Performed by: SURGERY

## 2020-06-29 PROCEDURE — 77030034821 HC DEV ENDOSC DST ORVIL COVD -C: Performed by: SURGERY

## 2020-06-29 PROCEDURE — 74011000250 HC RX REV CODE- 250: Performed by: ANESTHESIOLOGY

## 2020-06-29 PROCEDURE — 76060000037 HC ANESTHESIA 3 TO 3.5 HR: Performed by: SURGERY

## 2020-06-29 PROCEDURE — 93005 ELECTROCARDIOGRAM TRACING: CPT | Performed by: ANESTHESIOLOGY

## 2020-06-29 PROCEDURE — 77030010286 HC STPLR ENDOSC COVD -D: Performed by: SURGERY

## 2020-06-29 PROCEDURE — 87635 SARS-COV-2 COVID-19 AMP PRB: CPT

## 2020-06-29 PROCEDURE — 77030003578 HC NDL INSUF VERES AMR -B: Performed by: SURGERY

## 2020-06-29 PROCEDURE — 77030008522 HC TBNG INSUF LAPRO STRY -B: Performed by: SURGERY

## 2020-06-29 PROCEDURE — 77030000038 HC TIP SCIS LAPSCP SURI -B: Performed by: SURGERY

## 2020-06-29 PROCEDURE — 77030013532 HC SEAL FBRN TISSL RDYTUSE 4ML BAXT -C: Performed by: SURGERY

## 2020-06-29 PROCEDURE — 76210000016 HC OR PH I REC 1 TO 1.5 HR: Performed by: SURGERY

## 2020-06-29 PROCEDURE — 77030008023 HC RELD SUT ENDOSC COVD -B: Performed by: SURGERY

## 2020-06-29 PROCEDURE — 77030009957 HC RELD ENDOSTCH COVD -C: Performed by: SURGERY

## 2020-06-29 PROCEDURE — 0BQT4ZZ REPAIR DIAPHRAGM, PERCUTANEOUS ENDOSCOPIC APPROACH: ICD-10-PCS | Performed by: SURGERY

## 2020-06-29 PROCEDURE — 36415 COLL VENOUS BLD VENIPUNCTURE: CPT

## 2020-06-29 PROCEDURE — 77030008606 HC TRCR ENDOSC KII AMR -B: Performed by: SURGERY

## 2020-06-29 PROCEDURE — 77030010507 HC ADH SKN DERMBND J&J -B: Performed by: SURGERY

## 2020-06-29 PROCEDURE — 77030002933 HC SUT MCRYL J&J -A: Performed by: SURGERY

## 2020-06-29 PROCEDURE — 0D164ZA BYPASS STOMACH TO JEJUNUM, PERCUTANEOUS ENDOSCOPIC APPROACH: ICD-10-PCS | Performed by: SURGERY

## 2020-06-29 PROCEDURE — 76010000172 HC OR TIME 2.5 TO 3 HR INTENSV-TIER 1: Performed by: SURGERY

## 2020-06-29 PROCEDURE — 77030027876 HC STPLR ENDOSC FLX PWR J&J -G1: Performed by: SURGERY

## 2020-06-29 PROCEDURE — 77030036554: Performed by: SURGERY

## 2020-06-29 PROCEDURE — 86900 BLOOD TYPING SEROLOGIC ABO: CPT

## 2020-06-29 PROCEDURE — 77030036732 HC RELD STPLR VASC J&J -F: Performed by: SURGERY

## 2020-06-29 PROCEDURE — 65270000029 HC RM PRIVATE

## 2020-06-29 PROCEDURE — 77030018836 HC SOL IRR NACL ICUM -A: Performed by: SURGERY

## 2020-06-29 PROCEDURE — 77030037088 HC TUBE ENDOTRACH ORAL NSL COVD-A: Performed by: ANESTHESIOLOGY

## 2020-06-29 PROCEDURE — 77030009968 HC RELD STPLR ENDOSC J&J -D: Performed by: SURGERY

## 2020-06-29 PROCEDURE — 81025 URINE PREGNANCY TEST: CPT

## 2020-06-29 PROCEDURE — 77030040922 HC BLNKT HYPOTHRM STRY -A: Performed by: ANESTHESIOLOGY

## 2020-06-29 PROCEDURE — 74011000250 HC RX REV CODE- 250: Performed by: SURGERY

## 2020-06-29 PROCEDURE — 77030008771 HC TU NG SALEM SUMP -A: Performed by: ANESTHESIOLOGY

## 2020-06-29 RX ORDER — DIPHENHYDRAMINE HYDROCHLORIDE 50 MG/ML
INJECTION, SOLUTION INTRAMUSCULAR; INTRAVENOUS AS NEEDED
Status: DISCONTINUED | OUTPATIENT
Start: 2020-06-29 | End: 2020-06-29 | Stop reason: HOSPADM

## 2020-06-29 RX ORDER — GABAPENTIN 100 MG/1
200 CAPSULE ORAL 2 TIMES DAILY
Status: DISCONTINUED | OUTPATIENT
Start: 2020-06-29 | End: 2020-07-01 | Stop reason: HOSPADM

## 2020-06-29 RX ORDER — OXYCODONE HYDROCHLORIDE 5 MG/1
5 TABLET ORAL
Status: DISCONTINUED | OUTPATIENT
Start: 2020-06-29 | End: 2020-06-30

## 2020-06-29 RX ORDER — HEPARIN SODIUM 5000 [USP'U]/ML
5000 INJECTION, SOLUTION INTRAVENOUS; SUBCUTANEOUS ONCE
Status: COMPLETED | OUTPATIENT
Start: 2020-06-29 | End: 2020-06-29

## 2020-06-29 RX ORDER — SUCRALFATE 1 G/1
1 TABLET ORAL 4 TIMES DAILY
Status: DISCONTINUED | OUTPATIENT
Start: 2020-06-29 | End: 2020-07-01 | Stop reason: HOSPADM

## 2020-06-29 RX ORDER — LIDOCAINE HYDROCHLORIDE ANHYDROUS AND DEXTROSE MONOHYDRATE .8; 5 G/100ML; G/100ML
INJECTION, SOLUTION INTRAVENOUS
Status: DISCONTINUED | OUTPATIENT
Start: 2020-06-29 | End: 2020-06-29 | Stop reason: HOSPADM

## 2020-06-29 RX ORDER — LIDOCAINE HYDROCHLORIDE 10 MG/ML
0.1 INJECTION INFILTRATION; PERINEURAL AS NEEDED
Status: DISCONTINUED | OUTPATIENT
Start: 2020-06-29 | End: 2020-06-29 | Stop reason: HOSPADM

## 2020-06-29 RX ORDER — SODIUM CHLORIDE, SODIUM LACTATE, POTASSIUM CHLORIDE, CALCIUM CHLORIDE 600; 310; 30; 20 MG/100ML; MG/100ML; MG/100ML; MG/100ML
25 INJECTION, SOLUTION INTRAVENOUS CONTINUOUS
Status: DISCONTINUED | OUTPATIENT
Start: 2020-06-29 | End: 2020-06-29 | Stop reason: HOSPADM

## 2020-06-29 RX ORDER — OXYCODONE HYDROCHLORIDE 5 MG/1
5 TABLET ORAL
Status: DISCONTINUED | OUTPATIENT
Start: 2020-06-29 | End: 2020-06-29 | Stop reason: HOSPADM

## 2020-06-29 RX ORDER — BUPIVACAINE HYDROCHLORIDE 5 MG/ML
INJECTION, SOLUTION EPIDURAL; INTRACAUDAL AS NEEDED
Status: DISCONTINUED | OUTPATIENT
Start: 2020-06-29 | End: 2020-06-29 | Stop reason: HOSPADM

## 2020-06-29 RX ORDER — NALOXONE HYDROCHLORIDE 0.4 MG/ML
0.4 INJECTION, SOLUTION INTRAMUSCULAR; INTRAVENOUS; SUBCUTANEOUS AS NEEDED
Status: DISCONTINUED | OUTPATIENT
Start: 2020-06-29 | End: 2020-07-01 | Stop reason: HOSPADM

## 2020-06-29 RX ORDER — SUCCINYLCHOLINE CHLORIDE 20 MG/ML
INJECTION INTRAMUSCULAR; INTRAVENOUS AS NEEDED
Status: DISCONTINUED | OUTPATIENT
Start: 2020-06-29 | End: 2020-06-29 | Stop reason: HOSPADM

## 2020-06-29 RX ORDER — DIPHENHYDRAMINE HYDROCHLORIDE 50 MG/ML
12.5 INJECTION, SOLUTION INTRAMUSCULAR; INTRAVENOUS
Status: DISCONTINUED | OUTPATIENT
Start: 2020-06-29 | End: 2020-07-01 | Stop reason: HOSPADM

## 2020-06-29 RX ORDER — ACETAMINOPHEN 10 MG/ML
1000 INJECTION, SOLUTION INTRAVENOUS EVERY 6 HOURS
Status: COMPLETED | OUTPATIENT
Start: 2020-06-29 | End: 2020-06-30

## 2020-06-29 RX ORDER — SODIUM CHLORIDE, SODIUM LACTATE, POTASSIUM CHLORIDE, CALCIUM CHLORIDE 600; 310; 30; 20 MG/100ML; MG/100ML; MG/100ML; MG/100ML
75 INJECTION, SOLUTION INTRAVENOUS CONTINUOUS
Status: DISPENSED | OUTPATIENT
Start: 2020-06-29 | End: 2020-06-30

## 2020-06-29 RX ORDER — ONDANSETRON 2 MG/ML
4 INJECTION INTRAMUSCULAR; INTRAVENOUS
Status: DISCONTINUED | OUTPATIENT
Start: 2020-06-29 | End: 2020-06-29 | Stop reason: HOSPADM

## 2020-06-29 RX ORDER — SODIUM CHLORIDE 9 MG/ML
INJECTION, SOLUTION INTRAVENOUS
Status: DISCONTINUED | OUTPATIENT
Start: 2020-06-29 | End: 2020-06-29 | Stop reason: HOSPADM

## 2020-06-29 RX ORDER — PROPOFOL 10 MG/ML
VIAL (ML) INTRAVENOUS
Status: DISCONTINUED | OUTPATIENT
Start: 2020-06-29 | End: 2020-06-29 | Stop reason: HOSPADM

## 2020-06-29 RX ORDER — BACITRACIN 50000 [IU]/1
INJECTION, POWDER, FOR SOLUTION INTRAMUSCULAR AS NEEDED
Status: DISCONTINUED | OUTPATIENT
Start: 2020-06-29 | End: 2020-06-29 | Stop reason: HOSPADM

## 2020-06-29 RX ORDER — LIDOCAINE HYDROCHLORIDE 20 MG/ML
INJECTION, SOLUTION EPIDURAL; INFILTRATION; INTRACAUDAL; PERINEURAL AS NEEDED
Status: DISCONTINUED | OUTPATIENT
Start: 2020-06-29 | End: 2020-06-29 | Stop reason: HOSPADM

## 2020-06-29 RX ORDER — SCOLOPAMINE TRANSDERMAL SYSTEM 1 MG/1
1 PATCH, EXTENDED RELEASE TRANSDERMAL ONCE
Status: DISCONTINUED | OUTPATIENT
Start: 2020-06-29 | End: 2020-06-29 | Stop reason: SDUPTHER

## 2020-06-29 RX ORDER — ALBUTEROL SULFATE 0.83 MG/ML
2.5 SOLUTION RESPIRATORY (INHALATION) AS NEEDED
Status: DISCONTINUED | OUTPATIENT
Start: 2020-06-29 | End: 2020-06-29 | Stop reason: HOSPADM

## 2020-06-29 RX ORDER — PROPOFOL 10 MG/ML
INJECTION, EMULSION INTRAVENOUS AS NEEDED
Status: DISCONTINUED | OUTPATIENT
Start: 2020-06-29 | End: 2020-06-29 | Stop reason: HOSPADM

## 2020-06-29 RX ORDER — ONDANSETRON 2 MG/ML
4 INJECTION INTRAMUSCULAR; INTRAVENOUS EVERY 4 HOURS
Status: DISCONTINUED | OUTPATIENT
Start: 2020-06-29 | End: 2020-07-01 | Stop reason: HOSPADM

## 2020-06-29 RX ORDER — MIDAZOLAM HYDROCHLORIDE 1 MG/ML
2 INJECTION, SOLUTION INTRAMUSCULAR; INTRAVENOUS
Status: COMPLETED | OUTPATIENT
Start: 2020-06-29 | End: 2020-06-29

## 2020-06-29 RX ORDER — EPHEDRINE SULFATE/0.9% NACL/PF 50 MG/5 ML
SYRINGE (ML) INTRAVENOUS AS NEEDED
Status: DISCONTINUED | OUTPATIENT
Start: 2020-06-29 | End: 2020-06-29 | Stop reason: HOSPADM

## 2020-06-29 RX ORDER — APREPITANT 40 MG/1
40 CAPSULE ORAL ONCE
Status: COMPLETED | OUTPATIENT
Start: 2020-06-29 | End: 2020-06-29

## 2020-06-29 RX ORDER — HYDROMORPHONE HYDROCHLORIDE 2 MG/ML
0.5 INJECTION, SOLUTION INTRAMUSCULAR; INTRAVENOUS; SUBCUTANEOUS
Status: DISCONTINUED | OUTPATIENT
Start: 2020-06-29 | End: 2020-06-29 | Stop reason: HOSPADM

## 2020-06-29 RX ORDER — ACETAMINOPHEN 500 MG
1000 TABLET ORAL EVERY 6 HOURS
Status: DISCONTINUED | OUTPATIENT
Start: 2020-06-30 | End: 2020-07-01 | Stop reason: HOSPADM

## 2020-06-29 RX ORDER — PANTOPRAZOLE SODIUM 40 MG/10ML
40 INJECTION, POWDER, LYOPHILIZED, FOR SOLUTION INTRAVENOUS DAILY
Status: DISCONTINUED | OUTPATIENT
Start: 2020-06-30 | End: 2020-07-01 | Stop reason: HOSPADM

## 2020-06-29 RX ORDER — KETOROLAC TROMETHAMINE 30 MG/ML
30 INJECTION, SOLUTION INTRAMUSCULAR; INTRAVENOUS EVERY 6 HOURS
Status: DISCONTINUED | OUTPATIENT
Start: 2020-06-29 | End: 2020-06-30

## 2020-06-29 RX ORDER — HEPARIN SODIUM 5000 [USP'U]/ML
5000 INJECTION, SOLUTION INTRAVENOUS; SUBCUTANEOUS EVERY 8 HOURS
Status: DISCONTINUED | OUTPATIENT
Start: 2020-06-30 | End: 2020-07-01 | Stop reason: HOSPADM

## 2020-06-29 RX ORDER — KETAMINE HYDROCHLORIDE 50 MG/ML
INJECTION, SOLUTION INTRAMUSCULAR; INTRAVENOUS AS NEEDED
Status: DISCONTINUED | OUTPATIENT
Start: 2020-06-29 | End: 2020-06-29 | Stop reason: HOSPADM

## 2020-06-29 RX ORDER — ROCURONIUM BROMIDE 10 MG/ML
INJECTION, SOLUTION INTRAVENOUS AS NEEDED
Status: DISCONTINUED | OUTPATIENT
Start: 2020-06-29 | End: 2020-06-29 | Stop reason: HOSPADM

## 2020-06-29 RX ORDER — LIDOCAINE HYDROCHLORIDE ANHYDROUS AND DEXTROSE MONOHYDRATE .4; 5 G/100ML; G/100ML
1 INJECTION, SOLUTION INTRAVENOUS CONTINUOUS
Status: ACTIVE | OUTPATIENT
Start: 2020-06-29 | End: 2020-06-30

## 2020-06-29 RX ORDER — ONDANSETRON 2 MG/ML
INJECTION INTRAMUSCULAR; INTRAVENOUS AS NEEDED
Status: DISCONTINUED | OUTPATIENT
Start: 2020-06-29 | End: 2020-06-29 | Stop reason: HOSPADM

## 2020-06-29 RX ORDER — ACETAMINOPHEN 500 MG
1000 TABLET ORAL ONCE
Status: COMPLETED | OUTPATIENT
Start: 2020-06-29 | End: 2020-06-29

## 2020-06-29 RX ORDER — HYDROMORPHONE HYDROCHLORIDE 1 MG/ML
0.5 INJECTION, SOLUTION INTRAMUSCULAR; INTRAVENOUS; SUBCUTANEOUS
Status: DISCONTINUED | OUTPATIENT
Start: 2020-06-29 | End: 2020-06-30

## 2020-06-29 RX ORDER — SCOLOPAMINE TRANSDERMAL SYSTEM 1 MG/1
1 PATCH, EXTENDED RELEASE TRANSDERMAL ONCE
Status: DISCONTINUED | OUTPATIENT
Start: 2020-06-29 | End: 2020-07-01 | Stop reason: HOSPADM

## 2020-06-29 RX ADMIN — SUCRALFATE 1 G: 1 TABLET ORAL at 23:32

## 2020-06-29 RX ADMIN — HYDROMORPHONE HYDROCHLORIDE 0.5 MG: 2 INJECTION INTRAMUSCULAR; INTRAVENOUS; SUBCUTANEOUS at 16:05

## 2020-06-29 RX ADMIN — HEPARIN SODIUM 5000 UNITS: 5000 INJECTION INTRAVENOUS; SUBCUTANEOUS at 11:46

## 2020-06-29 RX ADMIN — ONDANSETRON 4 MG: 2 INJECTION INTRAMUSCULAR; INTRAVENOUS at 15:32

## 2020-06-29 RX ADMIN — DIPHENHYDRAMINE HYDROCHLORIDE 10 MG: 50 INJECTION, SOLUTION INTRAMUSCULAR; INTRAVENOUS at 13:35

## 2020-06-29 RX ADMIN — KETAMINE HYDROCHLORIDE 30 MG: 50 INJECTION INTRAMUSCULAR; INTRAVENOUS at 14:58

## 2020-06-29 RX ADMIN — MIDAZOLAM 2 MG: 1 INJECTION INTRAMUSCULAR; INTRAVENOUS at 12:17

## 2020-06-29 RX ADMIN — PROPOFOL 100 MCG/KG/MIN: 10 INJECTION, EMULSION INTRAVENOUS at 13:15

## 2020-06-29 RX ADMIN — LIDOCAINE HYDROCHLORIDE 0.1 ML: 10 INJECTION, SOLUTION INFILTRATION; PERINEURAL at 11:49

## 2020-06-29 RX ADMIN — SUCRALFATE 1 G: 1 TABLET ORAL at 18:51

## 2020-06-29 RX ADMIN — HYDROMORPHONE HYDROCHLORIDE 0.5 MG: 2 INJECTION INTRAMUSCULAR; INTRAVENOUS; SUBCUTANEOUS at 16:21

## 2020-06-29 RX ADMIN — PROPOFOL: 10 INJECTION, EMULSION INTRAVENOUS at 13:30

## 2020-06-29 RX ADMIN — KETOROLAC TROMETHAMINE 30 MG: 30 INJECTION, SOLUTION INTRAMUSCULAR at 23:32

## 2020-06-29 RX ADMIN — LIDOCAINE HYDROCHLORIDE 100 MG: 20 INJECTION, SOLUTION EPIDURAL; INFILTRATION; INTRACAUDAL; PERINEURAL at 13:02

## 2020-06-29 RX ADMIN — ACETAMINOPHEN 1000 MG: 10 INJECTION, SOLUTION INTRAVENOUS at 23:32

## 2020-06-29 RX ADMIN — KETAMINE HYDROCHLORIDE 30 MG: 50 INJECTION INTRAMUSCULAR; INTRAVENOUS at 14:02

## 2020-06-29 RX ADMIN — ACETAMINOPHEN 1000 MG: 500 TABLET, FILM COATED ORAL at 11:45

## 2020-06-29 RX ADMIN — ACETAMINOPHEN 1000 MG: 10 INJECTION, SOLUTION INTRAVENOUS at 19:03

## 2020-06-29 RX ADMIN — PROPOFOL 50 MCG/KG/MIN: 10 INJECTION, EMULSION INTRAVENOUS at 15:01

## 2020-06-29 RX ADMIN — PROMETHAZINE HYDROCHLORIDE 6.25 MG: 25 INJECTION INTRAMUSCULAR; INTRAVENOUS at 16:43

## 2020-06-29 RX ADMIN — HEPARIN SODIUM 5000 UNITS: 5000 INJECTION INTRAVENOUS; SUBCUTANEOUS at 23:32

## 2020-06-29 RX ADMIN — ROCURONIUM BROMIDE 10 MG: 10 INJECTION, SOLUTION INTRAVENOUS at 13:02

## 2020-06-29 RX ADMIN — APREPITANT 40 MG: 40 CAPSULE ORAL at 11:44

## 2020-06-29 RX ADMIN — KETOROLAC TROMETHAMINE 30 MG: 30 INJECTION, SOLUTION INTRAMUSCULAR at 18:57

## 2020-06-29 RX ADMIN — Medication 10 MG: at 13:50

## 2020-06-29 RX ADMIN — PROPOFOL 200 MG: 10 INJECTION, EMULSION INTRAVENOUS at 13:02

## 2020-06-29 RX ADMIN — CEFAZOLIN 3 G: 1 INJECTION, POWDER, FOR SOLUTION INTRAVENOUS at 13:07

## 2020-06-29 RX ADMIN — HYDROMORPHONE HYDROCHLORIDE 0.5 MG: 2 INJECTION INTRAMUSCULAR; INTRAVENOUS; SUBCUTANEOUS at 16:27

## 2020-06-29 RX ADMIN — ROCURONIUM BROMIDE 10 MG: 10 INJECTION, SOLUTION INTRAVENOUS at 13:57

## 2020-06-29 RX ADMIN — HYDROMORPHONE HYDROCHLORIDE 0.5 MG: 2 INJECTION INTRAMUSCULAR; INTRAVENOUS; SUBCUTANEOUS at 16:11

## 2020-06-29 RX ADMIN — Medication 10 MG: at 14:13

## 2020-06-29 RX ADMIN — PROPOFOL: 10 INJECTION, EMULSION INTRAVENOUS at 14:12

## 2020-06-29 RX ADMIN — PROPOFOL: 10 INJECTION, EMULSION INTRAVENOUS at 14:30

## 2020-06-29 RX ADMIN — ONDANSETRON 4 MG: 2 INJECTION INTRAMUSCULAR; INTRAVENOUS at 18:54

## 2020-06-29 RX ADMIN — SODIUM CHLORIDE, SODIUM LACTATE, POTASSIUM CHLORIDE, AND CALCIUM CHLORIDE: 600; 310; 30; 20 INJECTION, SOLUTION INTRAVENOUS at 14:11

## 2020-06-29 RX ADMIN — ONDANSETRON 4 MG: 2 INJECTION INTRAMUSCULAR; INTRAVENOUS at 23:32

## 2020-06-29 RX ADMIN — SUCCINYLCHOLINE CHLORIDE 160 MG: 20 INJECTION, SOLUTION INTRAMUSCULAR; INTRAVENOUS at 13:02

## 2020-06-29 RX ADMIN — KETAMINE HYDROCHLORIDE 70 MG: 50 INJECTION INTRAMUSCULAR; INTRAVENOUS at 13:02

## 2020-06-29 RX ADMIN — PROPOFOL: 10 INJECTION, EMULSION INTRAVENOUS at 13:45

## 2020-06-29 RX ADMIN — OXYCODONE 5 MG: 5 TABLET ORAL at 19:18

## 2020-06-29 RX ADMIN — ROCURONIUM BROMIDE 30 MG: 10 INJECTION, SOLUTION INTRAVENOUS at 13:09

## 2020-06-29 RX ADMIN — SODIUM CHLORIDE: 9 INJECTION, SOLUTION INTRAVENOUS at 13:12

## 2020-06-29 RX ADMIN — SODIUM CHLORIDE, SODIUM LACTATE, POTASSIUM CHLORIDE, AND CALCIUM CHLORIDE: 600; 310; 30; 20 INJECTION, SOLUTION INTRAVENOUS at 15:38

## 2020-06-29 RX ADMIN — SODIUM CHLORIDE, SODIUM LACTATE, POTASSIUM CHLORIDE, AND CALCIUM CHLORIDE 25 ML/HR: 600; 310; 30; 20 INJECTION, SOLUTION INTRAVENOUS at 11:39

## 2020-06-29 RX ADMIN — LIDOCAINE HYDROCHLORIDE ANHYDROUS AND DEXTROSE MONOHYDRATE 1 MG/KG/HR: .8; 5 INJECTION, SOLUTION INTRAVENOUS at 13:12

## 2020-06-29 RX ADMIN — GABAPENTIN 200 MG: 100 CAPSULE ORAL at 18:51

## 2020-06-29 RX ADMIN — SUGAMMADEX 200 MG: 100 INJECTION, SOLUTION INTRAVENOUS at 15:41

## 2020-06-29 NOTE — ANESTHESIA POSTPROCEDURE EVALUATION
Procedure(s):  ERAS / LAPAROSCOPIC GASTRIC BYPASS/ HIATAL HERNIA REPAIR  ESOPHAGOGASTRODUODENOSCOPY (EGD). total IV anesthesia    Anesthesia Post Evaluation      Multimodal analgesia: multimodal analgesia used between 6 hours prior to anesthesia start to PACU discharge  Patient location during evaluation: PACU  Patient participation: complete - patient participated  Level of consciousness: awake  Pain management: adequate  Airway patency: patent  Anesthetic complications: no  Cardiovascular status: acceptable and hemodynamically stable  Respiratory status: acceptable  Hydration status: acceptable  Comments: Acceptable for discharge from PACU.   Post anesthesia nausea and vomiting:  controlled  Final Post Anesthesia Temperature Assessment:  Normothermia (36.0-37.5 degrees C)      INITIAL Post-op Vital signs:   Vitals Value Taken Time   /80 6/29/2020  4:35 PM   Temp 37.4 °C (99.3 °F) 6/29/2020  4:01 PM   Pulse 77 6/29/2020  4:35 PM   Resp 16 6/29/2020  4:35 PM   SpO2 98 % 6/29/2020  4:35 PM

## 2020-06-29 NOTE — PROGRESS NOTES
END OF SHIFT NOTE:    Intake/Output  06/29 1901 - 06/30 0700  In: 2585 [I.V.:2585]  Out: -    Voiding: YES unknown   Catheter: NO  Drain:              Stool:  0 occurrences. Stool Assessment  Stool Appearance: Formed (06/29/20 1121)    Emesis:  0 occurrences. VITAL SIGNS  Patient Vitals for the past 12 hrs:   Temp Pulse Resp BP SpO2   06/29/20 1726 97.8 °F (36.6 °C) 80 18 134/77 96 %   06/29/20 1655  79 16 157/73 95 %   06/29/20 1645  76 16 160/83 99 %   06/29/20 1635  77 16 165/80 98 %   06/29/20 1630  77 16 159/77 94 %   06/29/20 1625  78 18 156/84 98 %   06/29/20 1620  80 18 192/81 96 %   06/29/20 1615  79 18 164/75 99 %   06/29/20 1610  88 18 126/61 98 %   06/29/20 1605  77 18 145/63 99 %   06/29/20 1601 99.3 °F (37.4 °C) 81 20 170/74 100 %   06/29/20 1100 97.9 °F (36.6 °C) 91 18 141/74 97 %       Pain Assessment  Pain 1  Pain Scale 1: Visual (06/29/20 1941)  Pain Intensity 1: 0 (06/29/20 1941)  Patient Stated Pain Goal: 10 (06/29/20 1625)  Pain Location 1: Abdomen (06/29/20 1918)  Pain Orientation 1: Right (06/29/20 1105)  Pain Description 1: Aching (06/29/20 1918)  Pain Intervention(s) 1: Medication (see MAR) (06/29/20 1918)    Ambulating    Additional Information:     Shift report given to oncoming nurse at the bedside.     Kaci Day RN

## 2020-06-29 NOTE — INTERVAL H&P NOTE
Update History & Physical 
 
The Patient's History and Physical of June 24, Lap gastric bypass, hiatal hernia repair, EGD was reviewed with the patient and I examined the patient. There was no change. The surgical site was confirmed by the patient and me. Plan:  The risk, benefits, expected outcome, and alternative to the recommended procedure have been discussed with the patient. Patient understands and wants to proceed with the procedure.  
 
Electronically signed by Cely Cervantes MD on 6/29/2020 at 11:43 AM

## 2020-06-29 NOTE — PERIOP NOTES
TRANSFER - OUT REPORT:    Verbal report given to MS RN on Rasta Elizabeth  being transferred to [facility] for routine post - op       Report consisted of patients Situation, Background, Assessment and   Recommendations(SBAR). Information from the following report(s) SBAR, Kardex, OR Summary and Procedure Summary was reviewed with the receiving nurse. Lines:   Peripheral IV 06/29/20 Right Hand (Active)        Opportunity for questions and clarification was provided. Patient transported with:   O2 @ 2 liters  Tech    VTE prophylaxis orders have been written for Rasta Elizabeth. Patient and family given floor number and nurses name. Family updated re: pt status after security code verified.

## 2020-06-29 NOTE — PROGRESS NOTES
06/29/20 1726   Dual Skin Pressure Injury Assessment   Dual Skin Pressure Injury Assessment WDL   Second Care Provider (Based on [address] [place] [address]) Mary, RN    Skin Integumentary   Skin Integumentary (WDL) X    Pressure  Injury Documentation No Pressure Injury Noted-Pressure Ulcer Prevention Initiated   Skin Color Db  (face )   Skin Condition/Temp Dry; Warm   Skin Integrity Scars (comment)  (hip foot abd 6 lap sites )   Turgor Non-tenting   Hair Growth Present   Varicosities Absent

## 2020-06-29 NOTE — ANESTHESIA PREPROCEDURE EVALUATION
Relevant Problems   No relevant active problems       Anesthetic History     PONV (severe)     Comments: Bradycardia with anesthesia     Review of Systems / Medical History  Patient summary reviewed and pertinent labs reviewed    Pulmonary                   Neuro/Psych         Psychiatric history     Cardiovascular            Dysrhythmias (WPW)       Exercise tolerance: >4 METS     GI/Hepatic/Renal           PUD (history)     Endo/Other        Morbid obesity and arthritis     Other Findings   Comments: ICU RN           Physical Exam    Airway  Mallampati: II  TM Distance: 4 - 6 cm  Neck ROM: normal range of motion   Mouth opening: Normal     Cardiovascular    Rhythm: regular  Rate: normal         Dental  No notable dental hx       Pulmonary  Breath sounds clear to auscultation               Abdominal         Other Findings            Anesthetic Plan    ASA: 3  Anesthesia type: total IV anesthesia      Post-op pain plan if not by surgeon: peripheral nerve block single    Induction: Intravenous  Anesthetic plan and risks discussed with: Patient and Family

## 2020-06-29 NOTE — PERIOP NOTES
Lidocaine drip pump settings confirmed at Ideal body weight: 59.3 kg (130 lb 11.7 oz). Infusing at 14.8 ml/hour.

## 2020-06-29 NOTE — OP NOTES
Operative Report    Name: Ang Garza      MRN: [id number]       : 1970       Age: 51 y.o. Sex: female    Date of Surgery: 2020     Preoperative Diagnosis: Morbid obesity ([facility] [E66.01]     Postoperative Diagnosis: Morbid obesity ([facility] [E66.01]     Name of procedure: Procedure(s):  ERAS / LAPAROSCOPIC GASTRIC BYPASS/ HIATAL HERNIA REPAIR  ESOPHAGOGASTRODUODENOSCOPY (EGD)    Surgeon: Brielle Monahan MD     Anesthesia: General     Complications: None    Estimated Blood Loss: Minimal           Specimens: * No specimens in log *    Implants:  * No implants in log *    Findings:  Normal appearing intra-abdominal anatomy. Negative leak test. Normal appearing gastric and esophageal mucousa without staple line bleeding. Statement of Medical Necessity: Ang Garza is a 51 y.o. female who presented to the clinic with history of morbid obesity and Body mass index is 48.58 kg/m². She failed multiple weight loss attempts meets the NIH criteria for obesity surgery. She decided for a laparoscopic vs open gastric bypass and EGD. We discussed specific risks of gastric bypass including but not limited to: pain, infection, bleeding, scar, excess skin, conversion to open approach, hernia, injury to adjacent organs, need for blood transfusion, thromboembolic complications, gastrointestinal leak, stricture, difficulty swallowing, need for revisional surgery, need for gastrostomy/feeding tube, gastroesophageal reflux, esophagitis, need for revisional surgery, failure to lose weight or weight regain, nutritional deficiencies, heart attack, stroke, death. Patient understood and agreed to proceed. Procedure Details   After informed consent was taken, patient was taken to the operating room and placed in supine position with padding in all pressure points. Anesthesia was induced and patient was intubated. Patient received preoperative antibiotics.  Abdomen was prepped and draped in standard sterile fashion. A timeout was performed with all team members present. A 1 cm incision was made in the left upper quadrant. A Veress needle was inserted. Saline drop test was normal. The abdomen was insufflated with carbon dioxide to a pressure of 15 mmHg. The patient tolerated the insufflation well. A 5 mm trocar was inserted using an Optiview technique. A general survey was performed and no injury was observed from trocar placement. Two additional 5 mm trocars were placed in the right and left upper quadrants as well as a 12 mm and 15 mm trocars under direct visualization. A bilateral abdominal tap block was performed using Marcaine. A general survey was performed and  no adhesions were seen. There was a very small hiatal hernia. This was not dissected as the angle of His was in the abdomen. Attention was turned to the omentum which was retracted cephalad. The transverse colon and the ligament of Treitz were identified. The bowel was measured approximately 50 cm distal to the ligament of Treitz and divided using an ECHELON white loaded stapler. The mesentery was divided with Harmonic scalpel. Next, the Nicholas limb was measured to approximately 120 cm and a stapled jejunojejunostomy created with the biliopancreatic limb using multiple loads of the ECHELON white loaded stapler. The resulting mesenteric defect was closed with a running 2-0 Surgidac suture. An antiobstruction stitch at the proximal jejunostomy was also placed. Next, the Nicholas limb was brought antecolic and checked for length. The Nicholas limb was found to adequately reach the level of the gastric pouch. The omentum and gastrocolic ligament were divided to either side of the Nicholas limb using a Harmonic scalpel. A 5 mm tunnel was created distal and left lateral to the xiphoid, a Giorgi liver retractor advanced, and the liver retracted. The patient was seen to have a 1.5-2 cm hiatal hernia which was left intact and the stomach pulled down. Attention was turned to the cardia of the stomach where the lower edge of the pouch was delineated by dissecting posterior to the stomach. Next, the stomach pouch was created by divided it from the lower stomach using sequential firings the ECHELON stapler with green, gold and blue loads. The Orvil was advanced by anesthesia and taken out through the distal portion of the pouch. The NGT was removed and discarded. Attention was next turned to the Nicholas limb and care was taken to discern that there was no mesenteric twist from the jejunojejunostomy proximally. The proximal end of the Nicholas limb was opened using Harmonic scalpel. A 25 mm EEA stapler was advanced into the abdomen, then advanced into the Nicholas limb approximately 10 cm and pierced through the small bowel with its spike. The spike was brought out of the abdomen. The gastrojejunostomy was created by mating the Orvil with the circular stapler and firing. The redundant proximal Nicholas limb mesentery was divided with Harmonic scalpel and the Nicholas limb bowel was closed with a white loaded ECHELON stapler. Holding distal obstruction with a clamp, an EGD was advanced and a leak test perfomed with inflation while holding the gastrojejunostomy under sterile saline. Neither a leak nor obstruction were observed. Normal stomach and jejunal mucosa were observed without staple line bleeding. Next, the EGD was removed while suctioning. The gastrojejunostomy was observed and found to be tension free. Tension reducing 2-0 Vicryl sutures were placed in the gastrojejunostomy at 3, 9 and 12 o'clock. Tisseel was placed over the gastrojejunostomy and the remnant stomach staple line. Bleeding was controlled throughout the case using a Harmonic or electrocautery and 3-0 Vicryl sutures. The liver was released under direct vision and the [facility] retractor removed. The fascia of the 12 mm and 15 mm port sites were closed using 0-Vicryl ties and an endoclosure device. Pneumoperitoneum was evacuated and all trocars were removed. Care was taken to observe there was no bleeding at the trocar sites. The wounds were all cleansed, dried, and closed with 4-0 Monocryl subcuticular suture. Dermabond was applied over the incisions. All counts were reported as correct. The patient tolerated the procedure well and there were no immediate complications. Disposition: the patient was awakened from anesthesia and transferred to the PACU in stable condition.          Signed by: Belén Jimenez MD  [facility] - Bariatric & Minimally Invasive Surgery  6/29/2020 3:40 PM

## 2020-06-30 LAB
ANION GAP SERPL CALC-SCNC: 6 MMOL/L (ref 7–16)
BASOPHILS # BLD: 0 K/UL (ref 0–0.2)
BASOPHILS NFR BLD: 0 % (ref 0–2)
BUN SERPL-MCNC: 14 MG/DL (ref 6–23)
CALCIUM SERPL-MCNC: 8.3 MG/DL (ref 8.3–10.4)
CHLORIDE SERPL-SCNC: 107 MMOL/L (ref 98–107)
CO2 SERPL-SCNC: 23 MMOL/L (ref 21–32)
CREAT SERPL-MCNC: 0.9 MG/DL (ref 0.6–1)
DIFFERENTIAL METHOD BLD: ABNORMAL
EOSINOPHIL # BLD: 0 K/UL (ref 0–0.8)
EOSINOPHIL NFR BLD: 0 % (ref 0.5–7.8)
ERYTHROCYTE [DISTWIDTH] IN BLOOD BY AUTOMATED COUNT: 16.7 % (ref 11.9–14.6)
GLUCOSE SERPL-MCNC: 132 MG/DL (ref 65–100)
HCT VFR BLD AUTO: 31.2 % (ref 35.8–46.3)
HGB BLD-MCNC: 9.4 G/DL (ref 11.7–15.4)
IMM GRANULOCYTES # BLD AUTO: 0.1 K/UL (ref 0–0.5)
IMM GRANULOCYTES NFR BLD AUTO: 1 % (ref 0–5)
LYMPHOCYTES # BLD: 1 K/UL (ref 0.5–4.6)
LYMPHOCYTES NFR BLD: 12 % (ref 13–44)
MCH RBC QN AUTO: 25.1 PG (ref 26.1–32.9)
MCHC RBC AUTO-ENTMCNC: 30.1 G/DL (ref 31.4–35)
MCV RBC AUTO: 83.2 FL (ref 79.6–97.8)
MONOCYTES # BLD: 0.5 K/UL (ref 0.1–1.3)
MONOCYTES NFR BLD: 6 % (ref 4–12)
NEUTS SEG # BLD: 7 K/UL (ref 1.7–8.2)
NEUTS SEG NFR BLD: 81 % (ref 43–78)
NRBC # BLD: 0 K/UL (ref 0–0.2)
PLATELET # BLD AUTO: 204 K/UL (ref 150–450)
PMV BLD AUTO: 9.8 FL (ref 9.4–12.3)
POTASSIUM SERPL-SCNC: 3.5 MMOL/L (ref 3.5–5.1)
RBC # BLD AUTO: 3.75 M/UL (ref 4.05–5.2)
SODIUM SERPL-SCNC: 136 MMOL/L (ref 136–145)
WBC # BLD AUTO: 8.6 K/UL (ref 4.3–11.1)

## 2020-06-30 PROCEDURE — 74011250636 HC RX REV CODE- 250/636: Performed by: NURSE PRACTITIONER

## 2020-06-30 PROCEDURE — 85025 COMPLETE CBC W/AUTO DIFF WBC: CPT

## 2020-06-30 PROCEDURE — 74011250637 HC RX REV CODE- 250/637: Performed by: NURSE PRACTITIONER

## 2020-06-30 PROCEDURE — 80048 BASIC METABOLIC PNL TOTAL CA: CPT

## 2020-06-30 PROCEDURE — C9113 INJ PANTOPRAZOLE SODIUM, VIA: HCPCS | Performed by: NURSE PRACTITIONER

## 2020-06-30 PROCEDURE — 74011000250 HC RX REV CODE- 250: Performed by: NURSE PRACTITIONER

## 2020-06-30 PROCEDURE — 97161 PT EVAL LOW COMPLEX 20 MIN: CPT

## 2020-06-30 PROCEDURE — 74011250637 HC RX REV CODE- 250/637: Performed by: SURGERY

## 2020-06-30 PROCEDURE — 97110 THERAPEUTIC EXERCISES: CPT

## 2020-06-30 PROCEDURE — 65270000029 HC RM PRIVATE

## 2020-06-30 PROCEDURE — 74011250636 HC RX REV CODE- 250/636: Performed by: SURGERY

## 2020-06-30 PROCEDURE — 36415 COLL VENOUS BLD VENIPUNCTURE: CPT

## 2020-06-30 RX ORDER — PRAMIPEXOLE DIHYDROCHLORIDE 1 MG/1
3 TABLET ORAL
Status: DISCONTINUED | OUTPATIENT
Start: 2020-06-30 | End: 2020-07-01 | Stop reason: HOSPADM

## 2020-06-30 RX ORDER — KETOROLAC TROMETHAMINE 15 MG/ML
15 INJECTION, SOLUTION INTRAMUSCULAR; INTRAVENOUS EVERY 6 HOURS
Status: COMPLETED | OUTPATIENT
Start: 2020-06-30 | End: 2020-06-30

## 2020-06-30 RX ORDER — SIMETHICONE 80 MG
80 TABLET,CHEWABLE ORAL
Status: DISCONTINUED | OUTPATIENT
Start: 2020-06-30 | End: 2020-07-01 | Stop reason: HOSPADM

## 2020-06-30 RX ORDER — OXYCODONE HYDROCHLORIDE 5 MG/1
5 TABLET ORAL
Status: DISCONTINUED | OUTPATIENT
Start: 2020-06-30 | End: 2020-07-01 | Stop reason: HOSPADM

## 2020-06-30 RX ORDER — HYDROMORPHONE HYDROCHLORIDE 1 MG/ML
1 INJECTION, SOLUTION INTRAMUSCULAR; INTRAVENOUS; SUBCUTANEOUS
Status: DISCONTINUED | OUTPATIENT
Start: 2020-06-30 | End: 2020-07-01 | Stop reason: HOSPADM

## 2020-06-30 RX ORDER — KETOROLAC TROMETHAMINE 30 MG/ML
30 INJECTION, SOLUTION INTRAMUSCULAR; INTRAVENOUS EVERY 6 HOURS
Status: DISCONTINUED | OUTPATIENT
Start: 2020-06-30 | End: 2020-07-01 | Stop reason: HOSPADM

## 2020-06-30 RX ADMIN — GABAPENTIN 200 MG: 100 CAPSULE ORAL at 18:11

## 2020-06-30 RX ADMIN — ONDANSETRON 4 MG: 2 INJECTION INTRAMUSCULAR; INTRAVENOUS at 11:43

## 2020-06-30 RX ADMIN — SUCRALFATE 1 G: 1 TABLET ORAL at 18:11

## 2020-06-30 RX ADMIN — ACETAMINOPHEN 500 MG: 500 TABLET, FILM COATED ORAL at 18:15

## 2020-06-30 RX ADMIN — HEPARIN SODIUM 5000 UNITS: 5000 INJECTION INTRAVENOUS; SUBCUTANEOUS at 16:57

## 2020-06-30 RX ADMIN — KETOROLAC TROMETHAMINE 30 MG: 30 INJECTION, SOLUTION INTRAMUSCULAR at 18:11

## 2020-06-30 RX ADMIN — KETOROLAC TROMETHAMINE 15 MG: 15 INJECTION, SOLUTION INTRAMUSCULAR; INTRAVENOUS at 13:52

## 2020-06-30 RX ADMIN — OXYCODONE 5 MG: 5 TABLET ORAL at 11:43

## 2020-06-30 RX ADMIN — HEPARIN SODIUM 5000 UNITS: 5000 INJECTION INTRAVENOUS; SUBCUTANEOUS at 23:32

## 2020-06-30 RX ADMIN — ACETAMINOPHEN 1000 MG: 10 INJECTION, SOLUTION INTRAVENOUS at 08:16

## 2020-06-30 RX ADMIN — PROMETHAZINE HYDROCHLORIDE 12.5 MG: 25 INJECTION INTRAMUSCULAR; INTRAVENOUS at 23:49

## 2020-06-30 RX ADMIN — KETOROLAC TROMETHAMINE 30 MG: 30 INJECTION, SOLUTION INTRAMUSCULAR at 23:32

## 2020-06-30 RX ADMIN — HEPARIN SODIUM 5000 UNITS: 5000 INJECTION INTRAVENOUS; SUBCUTANEOUS at 08:21

## 2020-06-30 RX ADMIN — PANTOPRAZOLE SODIUM 40 MG: 40 INJECTION, POWDER, FOR SOLUTION INTRAVENOUS at 08:28

## 2020-06-30 RX ADMIN — SODIUM CHLORIDE, SODIUM LACTATE, POTASSIUM CHLORIDE, AND CALCIUM CHLORIDE 125 ML/HR: 600; 310; 30; 20 INJECTION, SOLUTION INTRAVENOUS at 04:02

## 2020-06-30 RX ADMIN — ONDANSETRON 4 MG: 2 INJECTION INTRAMUSCULAR; INTRAVENOUS at 13:52

## 2020-06-30 RX ADMIN — SUCRALFATE 1 G: 1 TABLET ORAL at 08:25

## 2020-06-30 RX ADMIN — OXYCODONE 5 MG: 5 TABLET ORAL at 04:01

## 2020-06-30 RX ADMIN — ONDANSETRON 4 MG: 2 INJECTION INTRAMUSCULAR; INTRAVENOUS at 05:20

## 2020-06-30 RX ADMIN — SIMETHICONE CHEW TAB 80 MG 80 MG: 80 TABLET ORAL at 11:43

## 2020-06-30 RX ADMIN — KETOROLAC TROMETHAMINE 30 MG: 30 INJECTION, SOLUTION INTRAMUSCULAR at 05:20

## 2020-06-30 RX ADMIN — PRAMIPEXOLE DIHYDROCHLORIDE 3 MG: 1 TABLET ORAL at 21:05

## 2020-06-30 RX ADMIN — ONDANSETRON 4 MG: 2 INJECTION INTRAMUSCULAR; INTRAVENOUS at 18:10

## 2020-06-30 RX ADMIN — OXYCODONE 5 MG: 5 TABLET ORAL at 19:33

## 2020-06-30 RX ADMIN — SUCRALFATE 1 G: 1 TABLET ORAL at 21:06

## 2020-06-30 RX ADMIN — SUCRALFATE 1 G: 1 TABLET ORAL at 13:52

## 2020-06-30 RX ADMIN — ONDANSETRON 4 MG: 2 INJECTION INTRAMUSCULAR; INTRAVENOUS at 21:06

## 2020-06-30 RX ADMIN — OXYCODONE 5 MG: 5 TABLET ORAL at 00:09

## 2020-06-30 RX ADMIN — ACETAMINOPHEN 500 MG: 500 TABLET, FILM COATED ORAL at 13:14

## 2020-06-30 NOTE — PROGRESS NOTES
Latonya Lim MD   Bariatric & Advanced Laparoscopic Surgery & Endoscopy  [address] [place] [address]  [place]  Phone [phone]   Fax [phone]      Date of visit: 2020          Name: Kojo Lechuga      MRN: [id number]       : 1970       Age: 51 y.o. Sex: female          Subjective:     Kojo Lechuga is a 51 y.o. female s/p Procedure(s):  ERAS / LAPAROSCOPIC GASTRIC BYPASS/ HIATAL HERNIA REPAIR  ESOPHAGOGASTRODUODENOSCOPY (EGD) 1 Day Post-Op    POD 1  Nausea/Vomiting - none  HR overnight - Normal  Labs reviewed - within normal limits    Patient was awake and oriented in bed this morning. Her incisional  pain is under control. VSS, AF, -N/V, BM x 1 occurrence. Labs stable. She is voiding, ambulating and using IS without complications. Tolerating protein shakes well.      MEDS:    Current Facility-Administered Medications   Medication    acetaminophen (TYLENOL) tablet 1,000 mg    HYDROmorphone (PF) (DILAUDID) injection 0.5 mg    naloxone (NARCAN) injection 0.4 mg    oxyCODONE IR (ROXICODONE) tablet 5 mg    ketorolac (TORADOL) injection 30 mg    lidocaine in D5W 4 mg/mL (0.4 %) infusion    fat emulsion 20% (LIPOSYN, INTRAlipid) infusion    ondansetron (ZOFRAN) injection 4 mg    heparin (porcine) injection 5,000 Units    lactated Ringers infusion    diphenhydrAMINE (BENADRYL) injection 12.5 mg    gabapentin (NEURONTIN) capsule 200 mg    promethazine (PHENERGAN) with saline injection 12.5 mg    pantoprazole (PROTONIX) injection 40 mg    sucralfate (CARAFATE) tablet 1 g    scopolamine (TRANSDERM-SCOP) 1 mg over 3 days 1 Patch       ALLERGIES:       Allergies   Allergen Reactions    Nickel Rash    Sulfa (Sulfonamide Antibiotics) Rash       I/O:      Intake/Output Summary (Last 24 hours) at 2020 0925  Last data filed at 2020 0825  Gross per 24 hour   Intake 3770 ml   Output 500 ml   Net 3270 ml           Physical Exam: Visit Vitals  /72 (BP 1 Location: Left arm, BP Patient Position: At rest;Supine)   Pulse 76   Temp 98.3 °F (36.8 °C)   Resp 16   Ht 5' 6\" (1.676 m)   Wt 301 lb (136.5 kg)   SpO2 99%   BMI 48.58 kg/m²       General:  Well-developed, well-nourished, no distress. Psych:  Cooperative, good insight and judgement. Neuro:  Alert, oriented to person, place and time. HEENT:  Normocephalic, atraumatic. Sclera clear. Lungs:  Unlabored breathing. Symmetrical chest expansion. Heart:  Regular rate and rhythm. No JVD. Abdomen:  Soft, non distended, appropriately ttp at incision sites. BS's active. Lap incisions C/D/I. Abd binder in place. Extremities:  Extremities normal, atraumatic, no cyanosis or edema. Labs: All recent labs were reviewed.    Recent Results (from the past 24 hour(s))   SARS-COV-2    Collection Time: 06/29/20 10:58 AM   Result Value Ref Range    SARS-CoV-2 PENDING     Specimen source Nasopharyngeal      COVID-19 rapid test Not detected NOTD     TYPE & SCREEN    Collection Time: 06/29/20 11:50 AM   Result Value Ref Range    Crossmatch Expiration 07/02/2020     ABO/Rh(D) [facility] NEGATIVE     Antibody screen NEG    HCG URINE, QL. - POC    Collection Time: 06/29/20 11:51 AM   Result Value Ref Range    Pregnancy test,urine (POC) Negative NEG     METABOLIC PANEL, BASIC    Collection Time: 06/30/20  4:53 AM   Result Value Ref Range    Sodium 136 136 - 145 mmol/L    Potassium 3.5 3.5 - 5.1 mmol/L    Chloride 107 98 - 107 mmol/L    CO2 23 21 - 32 mmol/L    Anion gap 6 (L) 7 - 16 mmol/L    Glucose 132 (H) 65 - 100 mg/dL    BUN 14 6 - 23 MG/DL    Creatinine 0.90 0.6 - 1.0 MG/DL    GFR est AA >60 >60 ml/min/1.73m2    GFR est non-AA >60 >60 ml/min/1.73m2    Calcium 8.3 8.3 - 10.4 MG/DL   CBC WITH AUTOMATED DIFF    Collection Time: 06/30/20  4:53 AM   Result Value Ref Range    WBC 8.6 4.3 - 11.1 K/uL    RBC 3.75 (L) 4.05 - 5.2 M/uL    HGB 9.4 (L) 11.7 - 15.4 g/dL    HCT 31.2 (L) 35.8 - 46.3 %    MCV 83.2 79.6 - 97.8 FL    MCH 25.1 (L) 26.1 - 32.9 PG    MCHC 30.1 (L) 31.4 - 35.0 g/dL    RDW 16.7 (H) 11.9 - 14.6 %    PLATELET 229 246 - 410 K/uL    MPV 9.8 9.4 - 12.3 FL    ABSOLUTE NRBC 0.00 0.0 - 0.2 K/uL    DF AUTOMATED      NEUTROPHILS 81 (H) 43 - 78 %    LYMPHOCYTES 12 (L) 13 - 44 %    MONOCYTES 6 4.0 - 12.0 %    EOSINOPHILS 0 (L) 0.5 - 7.8 %    BASOPHILS 0 0.0 - 2.0 %    IMMATURE GRANULOCYTES 1 0.0 - 5.0 %    ABS. NEUTROPHILS 7.0 1.7 - 8.2 K/UL    ABS. LYMPHOCYTES 1.0 0.5 - 4.6 K/UL    ABS. MONOCYTES 0.5 0.1 - 1.3 K/UL    ABS. EOSINOPHILS 0.0 0.0 - 0.8 K/UL    ABS. BASOPHILS 0.0 0.0 - 0.2 K/UL    ABS. IMM. GRANS. 0.1 0.0 - 0.5 K/UL       Imaging: *No results found. Assessment/Plan:  Esau Connors is a 51 y.o. female s/p Procedure(s):  ERAS / LAPAROSCOPIC GASTRIC BYPASS/ HIATAL HERNIA REPAIR  ESOPHAGOGASTRODUODENOSCOPY (EGD)    Pain control   DIET BARIATRIC CLEAR LIQUID - Tolerating with incorporation of protein. Lap incisions C/D/I   No tachycardia overnight. Labs reviewed. WBC 8.6. Hgb stable @ 9.4. Abd binder for comfort  OOB and ambulate as tolerated. PT consulted.    DVT proph - SCDs/Heparin         Colette Ware NP    6/30/2020 9:25 AM

## 2020-06-30 NOTE — PROGRESS NOTES
Care Management Interventions  PCP Verified by CM: Yes(Recently move to [facility] so planning to obtain PCP there)  Palliative Care Criteria Met (RRAT>21 & CHF Dx)?: No(Risk 6% Dx S/P Gastric Bypass)  Transition of Care Consult (CM Consult): Discharge Planning  Discharge Durable Medical Equipment: No(wheelchair, walker, cane)  Physical Therapy Consult: Yes  Occupational Therapy Consult: No  Speech Therapy Consult: No  Current Support Network: Lives with Spouse(Davis [phone])  Confirm Follow Up Transport: Family  Discharge Location  Discharge Placement: Home with family assistance  Met with patient for d/c planning Patient S/P gastric bypass. She is alert and oriented x 3, independent of ADL's and lives with her . She stated 3 weeks ago they moved from their apartment to [facility]. States staying in a motel for now as her surgery was already schedule prior to her moving. She states [facility]  and mother assisting her as needed then return to [facility]. She was working at [employer] as a nurse and states will be working at [employer] when medically cleared. She is recovering from triple fusion of her foot and stated last week was the first time she was able to have complete weight bearing on foot. She is independent of ADL's but has assistance as needed with her spouse, mother and friend. DME includes walker, wheelchair and cane. She has needed transportation. She has [employer]. She voices no concerns or needs for d/c. Patient to [facility] with family for a few days then return to [facility].

## 2020-06-30 NOTE — PROGRESS NOTES
Problem: Mobility Impaired (Adult and Pediatric)  Goal: *Acute Goals and Plan of Care (Insert Text)  Description: STG:  (1.)Ms. Elizabeth Ayala will move from supine to sit and sit to supine  with INDEPENDENCE within 1-3 treatment day(s). (2.)Ms. Elizabeth Ayala will transfer from bed to chair and chair to bed with INDEPENDENCE using the least restrictive device within 1-3 treatment day(s). (3.)Ms. Elizabeth Ayala will ambulate with SUPERVISION/INDEPENDENCE for 150 feet with the least restrictive device within 1-3 treatment day(s). ________________________________________________________________________________________________     Outcome: Progressing Towards Goal     PHYSICAL THERAPY: Initial Assessment and AM 6/30/2020  INPATIENT: PT Visit Days : 1  Payor: MAYUR / Plan: Dipika Rios / Product Type: Bartolo Gonzalez /       NAME/AGE/GENDER: Aniket Snow is a 51 y.o. female   PRIMARY DIAGNOSIS: Morbid obesity ([facility] [E66.01]  Morbid obesity with BMI of 40.0-44.9, adult ([facility] [E66.01, Z68.41] <principal problem not specified> <principal problem not specified>  Procedure(s) (LRB):  ERAS / LAPAROSCOPIC GASTRIC BYPASS/ HIATAL HERNIA REPAIR (N/A)  ESOPHAGOGASTRODUODENOSCOPY (EGD) (N/A)  1 Day Post-Op  ICD-10: Treatment Diagnosis:    Generalized Muscle Weakness (M62.81)  Difficulty in walking, Not elsewhere classified (R26.2)   Precaution/Allergies:  Nickel and Sulfa (sulfonamide antibiotics)      ASSESSMENT:     Ms. Elizabeth Ayala presents s/p above procedure on 6/30/2020. She is not reporting any pain, has some generalized weakness and decreased independence with functional mobility. She will benefit from skilled PT interventions to maximize independence with functional mobility and HEP. She had a right ankle surgery recently and was NWB. She is now able to weight bear and is about 8 days out from being in a boot. Prior to that she was using a knee scooter for mobility.  She still lacks ankle range and this is impeding her gait and balance. She had one loss of balance in hallway, recovered with CGA. In room on side of bed worked on her HEP with verbal cues. Hope to progress at next therapy session. 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 This section established at most recent assessment   PROBLEM LIST (Impairments causing functional limitations):  Decreased Strength  Decreased ADL/Functional Activities  Decreased Transfer Abilities  Decreased Ambulation Ability/Technique  Decreased Balance  Decreased Activity Tolerance  Decreased [place] with Home Exercise Program   INTERVENTIONS PLANNED: (Benefits and precautions of physical therapy have been discussed with the patient.)  Bed Mobility  Gait Training  Therapeutic Activites  Therapeutic Exercise/Strengthening  Transfer Training     TREATMENT PLAN: Frequency/Duration: daily to twice daily for duration of hospital stay  Rehabilitation Potential For Stated Goals: Good     REHAB RECOMMENDATIONS (at time of discharge pending progress):    Placement: It is my opinion, based on this patient's performance to date, that Ms. Hafsa Weiner may benefit from continuing with her already current outpatient PT in [address] [place] [address] [place]:   None at this time              HISTORY:   History of Present Injury/Illness (Reason for Referral):  PER MD NOTE: Bebeto Barth returns to the [facility] after successful completion of our multidisciplinary surgical prep program.  This is her final consultation preparing her for weight loss surgery. She presents with a BMI 43.79. She has an Ideal body weight of 159 lbs, and excess body weight of 129 lbs. She started our program with a weight of 288 lbs. She has completed all aspects of our prep program and has been deemed an acceptable candidate for bariatric surgery. She recently had an abnormal esophageal manometry and is here today to discuss results and options of surgery.   Past Medical History/Comorbidities: Ms. Mitzi Barragan  has a past medical history of Adverse effect of anesthesia, Allergies, Anemia, Arthritis, Back problem, Broken bones, Calculus of kidney (1994), Chronic pain, Depression, History of seizure, Morbid obesity ([address] PONV (postoperative nausea and vomiting), PUD (peptic ulcer disease), and Thyroid disease. Ms. Mitzi Barragan  has a past surgical history that includes hx appendectomy (1983); hx cholecystectomy (1998); pr breast surgery procedure unlisted (1998); hx tonsillectomy (1995); hx orthopaedic (1996); and hx wisdom teeth extraction. Social History/Living Environment:   Home Environment: Private residence  One/Two Story Residence: Two story  Living Alone: No  Support Systems: Family member(s)  Patient Expects to be Discharged to[de-identified] Private residence  Current DME Used/Available at Home: None  Prior Level of Function/Work/Activity:  Pt lives at home, independent with mobility and ADLs     Number of Personal Factors/Comorbidities that affect the Plan of Care: 0: LOW COMPLEXITY   EXAMINATION:   Most Recent Physical Functioning:   Gross Assessment:  AROM: Within functional limits(except right ankle limited)  Strength: Within functional limits               Posture:  Posture (WDL): Within defined limits  Balance:  Sitting: Intact  Standing: Impaired  Standing - Static: Good  Standing - Dynamic : Fair Bed Mobility:  Supine to Sit: Stand-by assistance  Sit to Supine: Stand-by assistance  Wheelchair Mobility:     Transfers:  Sit to Stand: Stand-by assistance  Stand to Sit: Stand-by assistance  Gait:     Speed/Irma: Pace decreased (<100 feet/min)  Step Length: Left shortened  Stance: Right decreased  Gait Abnormalities: Antalgic;Decreased step clearance  Assistive Device: Other (comment)(pt held to IV pole)  Ambulation - Level of Assistance: Contact guard assistance      Body Structures Involved:  Digestive Structures  Muscles Body Functions Affected:   Movement Related  Digestive Activities and Participation Affected: Mobility  Self Care   Number of elements that affect the Plan of Care: 4+: HIGH COMPLEXITY   CLINICAL PRESENTATION:   Presentation: Stable and uncomplicated: LOW COMPLEXITY   CLINICAL DECISION MAKIN [facility] [address] [address] AM-PAC 6 Clicks   Basic Mobility Inpatient Short Form  How much difficulty does the patient currently have. .. Unable A Lot A Little None   1. Turning over in bed (including adjusting bedclothes, sheets and blankets)? [] 1   [] 2   [] 3   [x] 4   2. Sitting down on and standing up from a chair with arms ( e.g., wheelchair, bedside commode, etc.)   [] 1   [] 2   [] 3   [x] 4   3. Moving from lying on back to sitting on the side of the bed? [] 1   [] 2   [] 3   [x] 4   How much help from another person does the patient currently need. .. Total A Lot A Little None   4. Moving to and from a bed to a chair (including a wheelchair)? [] 1   [] 2   [] 3   [x] 4   5. Need to walk in hospital room? [] 1   [] 2   [x] 3   [] 4   6. Climbing 3-5 steps with a railing? [] 1   [] 2   [x] 3   [] 4   © , Trustees of [address] [facility] [address] [address], under license to [employer]. All rights reserved      Score:  Initial: 22 Most Recent: X (Date: -- )    Interpretation of Tool:  Represents activities that are increasingly more difficult (i.e. Bed mobility, Transfers, Gait). Medical Necessity:     Patient is expected to demonstrate progress in   strength and functional technique   to   decrease assistance required with functional mobility and HEP   . Reason for Services/Other Comments:  Patient continues to require skilled intervention due to   Inability to complete functional mobility and HEP independently   .    Use of outcome tool(s) and clinical judgement create a POC that gives a: Clear prediction of patient's progress: LOW COMPLEXITY            TREATMENT:   (In addition to Assessment/Re-Assessment sessions the following treatments were rendered)   Pre-treatment Symptoms/Complaints: none  Pain: Initial: 0/10     Post Session:  0/10     Therapeutic Exercise: (10 Minutes):  Exercises per grid below to improve mobility and strength. Required minimal verbal cues to promote proper body alignment and promote proper body posture. Progressed repetitions as indicated. Date:  6/30 Date:   Date:     Activity/Exercise Parameters Parameters Parameters   Ankle pumps 10     Long arc quads 10     Seated hip flexion 10     Elbow flexion 10     Shoulder flexion 10                     Braces/Orthotics/Lines/Etc:   IV  O2 Device: Room air  Treatment/Session Assessment:    Response to Treatment:  pt tolerated well  Interdisciplinary Collaboration:   Registered Nurse  After treatment position/precautions:   Supine in bed  Bed/Chair-wheels locked  Bed in low position  Call light within reach   Compliance with Program/Exercises: Compliant all of the time, Will assess as treatment progresses  Recommendations/Intent for next treatment session: \"Next visit will focus on advancements to more challenging activities and reduction in assistance provided\".   Total Treatment Duration:  PT Patient Time In/Time Out  Time In: 1035  Time Out: [address], PT

## 2020-06-30 NOTE — PROGRESS NOTES
END OF SHIFT NOTE:    Intake/Output  No intake/output data recorded. Voiding: YES  Catheter: NO  Drain:    stool:  1 occurrences. Stool Assessment  Stool Appearance: Formed (06/30/20 0804)    Emesis:  0 occurrences. VITAL SIGNS  Patient Vitals for the past 12 hrs:   Temp Pulse Resp BP SpO2   06/30/20 1615 99 °F (37.2 °C) 84 16 123/68 95 %   06/30/20 1129 98.6 °F (37 °C) 81 16 119/55 98 %   06/30/20 0803 98.3 °F (36.8 °C) 76 16 116/72 99 %       Pain Assessment  Pain 1  Pain Scale 1: (pt not reporting any pain) (06/30/20 1105)  Pain Intensity 1: 0 (06/30/20 0338)  Patient Stated Pain Goal: 0 (06/30/20 0338)  Pain Reassessment 1: Yes (06/30/20 0338)  Pain Location 1: Abdomen (06/29/20 1918)  Pain Orientation 1: Right (06/29/20 1105)  Pain Description 1: Aching (06/29/20 1918)  Pain Intervention(s) 1: Medication (see MAR) (06/29/20 1918)    Ambulating  Yes    Additional Information:   Pt asked to take 500 mg instead of 1000 due to warning on Epic, I messaged the pharmacy concerning dosage as well as the physician. Shift report given to oncoming nurse Mir Jansen RN at the bedside.     Richard Costa

## 2020-07-01 VITALS
TEMPERATURE: 97.6 F | HEIGHT: 66 IN | OXYGEN SATURATION: 100 % | BODY MASS INDEX: 47.09 KG/M2 | RESPIRATION RATE: 18 BRPM | SYSTOLIC BLOOD PRESSURE: 121 MMHG | HEART RATE: 82 BPM | DIASTOLIC BLOOD PRESSURE: 62 MMHG | WEIGHT: 293 LBS

## 2020-07-01 PROCEDURE — C9113 INJ PANTOPRAZOLE SODIUM, VIA: HCPCS | Performed by: NURSE PRACTITIONER

## 2020-07-01 PROCEDURE — 74011250637 HC RX REV CODE- 250/637: Performed by: NURSE PRACTITIONER

## 2020-07-01 PROCEDURE — 74011250637 HC RX REV CODE- 250/637: Performed by: SURGERY

## 2020-07-01 PROCEDURE — 74011250636 HC RX REV CODE- 250/636: Performed by: NURSE PRACTITIONER

## 2020-07-01 RX ADMIN — ACETAMINOPHEN 1000 MG: 500 TABLET, FILM COATED ORAL at 02:27

## 2020-07-01 RX ADMIN — ONDANSETRON 4 MG: 2 INJECTION INTRAMUSCULAR; INTRAVENOUS at 05:15

## 2020-07-01 RX ADMIN — PANTOPRAZOLE SODIUM 40 MG: 40 INJECTION, POWDER, FOR SOLUTION INTRAVENOUS at 08:00

## 2020-07-01 RX ADMIN — OXYCODONE 5 MG: 5 TABLET ORAL at 05:30

## 2020-07-01 RX ADMIN — SUCRALFATE 1 G: 1 TABLET ORAL at 09:42

## 2020-07-01 RX ADMIN — SIMETHICONE CHEW TAB 80 MG 80 MG: 80 TABLET ORAL at 00:00

## 2020-07-01 RX ADMIN — KETOROLAC TROMETHAMINE 30 MG: 30 INJECTION, SOLUTION INTRAMUSCULAR at 05:15

## 2020-07-01 RX ADMIN — ONDANSETRON 4 MG: 2 INJECTION INTRAMUSCULAR; INTRAVENOUS at 09:42

## 2020-07-01 RX ADMIN — ONDANSETRON 4 MG: 2 INJECTION INTRAMUSCULAR; INTRAVENOUS at 02:27

## 2020-07-01 RX ADMIN — HEPARIN SODIUM 5000 UNITS: 5000 INJECTION INTRAVENOUS; SUBCUTANEOUS at 08:02

## 2020-07-01 RX ADMIN — ACETAMINOPHEN 1000 MG: 500 TABLET, FILM COATED ORAL at 07:59

## 2020-07-01 NOTE — DISCHARGE INSTRUCTIONS
General Instructions  No bathtub or pool for 2 weeks. Ok to shower. No weight lifting greater than 20 lbs for 6 weeks. Leave the skin glue in place. It will fall off on its own in 7-10 days. Diet  Take 30 cc (1 oz) of water or protein every 30 minutes. You should be drinking about 64 ounces of fluids a day to prevent dehydration. You should be consuming about 60-80 grams of protein a day to allow for good wound healing and healthy weight loss. Medications  Take tylenol as needed for mild pain every 4-6 hours. Percocet prescribed for mod to severe pain. This is a narcotic and can cause drowsiness, nausea and vomiting and constipation. Take Colace 100mg BID (over the counter) if constipated. Take Protonix 40 mg daily for 3 months. Take Carafate 10 mL by mouth three (3) times daily for 30 days. Take Zofran 8 mg tablet as needed for nausea every 8 hours. Follow Up  Follow in bariatric clinic with Rafael Kumar MD in 10 days. Your appointment should be already scheduled but if not, please call and make an appointment.

## 2020-07-01 NOTE — DISCHARGE SUMMARY
[facility]  [place], [address] [facility]  [phone]   Discharge Summary     Clare Herrera  MRN: [id number]     : 1970     Age: 51 y.o. Admit date: 2020     Discharge date: 2020  Attending Physician: Dr. Magdalene Hagan  Primary Discharge Diagnosis:   Active Problems: Morbid obesity with BMI of 40.0-44.9, adult ([address] (2020)      Primary Operations or Procedures Performed :  Procedure(s):  ERAS / LAPAROSCOPIC GASTRIC BYPASS/ HIATAL HERNIA REPAIR  ESOPHAGOGASTRODUODENOSCOPY (EGD)     Brief History and Reason for Admission: Clare Herrera was admitted for laparoscopic bariatric surgery. She has a PMHx of OA, Venous Stasis, RLS, Depression and Anxiety, Chronic pain, Hypothyroidism and Morbid Obesity. She successfully completed our multidisciplinary surgical prep program.  She presents with a BMI 43.79. She has an Ideal body weight of 159 lbs, and excess body weight of 129 lbs. She started our program with a weight of 288 lbs.     She has completed all aspects of our prep program and has been deemed an acceptable candidate for bariatric surgery. She recently had an abnormal esophageal manometry and is here today to discuss results and options of surgery. Hospital Course:  2020: Patient underwent Laparoscopic Nicholas-en-Y gastric bypass with intraoperative EGD, without intraoperative complications   : POD 1  Nausea/Vomiting - none  HR overnight - Normal  Labs reviewed - within normal limits  Patient was awake and oriented in bed this morning. Her incisional  pain is under control. VSS, AF, -N/V, BM x 1 occurrence. Labs stable. She is voiding, ambulating and using IS without complications. Tolerating protein shakes well.     2020: POD 2  Nausea/Vomiting - none  HR overnight - Normal  Labs reviewed - within normal limits  Patient is awake and alert in bed this morning.  She states she felt a little dehydrated, education was given on importance and frequency of drinking fluids. Rx were sent to pharmacy. VSS, AF, -N/V, +BM, Voiding, ambulating, using IS without complications. Labs are stable.        The patient progressed satisfactorily meeting milestones necessary for successful discharge including tolerating a diet, adequate mobility, adequate pain control, and active bowel function. Patient was deemed a good candidate for discharge at the time of morning rounding. They are to follow up as indicated in their provided discharge paperwork. The patient helped develop and voices understanding with the plan of care. They are amenable without reservations at this time to moving forward with discharge. Condition at Discharge: Good    Discharge Medications:   Current Discharge Medication List      START taking these medications    Details   sucralfate (Carafate) 1 gram tablet Take 1 Tab by mouth four (4) times daily. Indications: gastroesophageal reflux disease  Qty: 120 Tab, Refills: 0    Associated Diagnoses: Gastroesophageal reflux disease, esophagitis presence not specified      ondansetron hcl (ZOFRAN) 8 mg tablet Take 1 Tab by mouth every eight (8) hours as needed for Nausea or Vomiting. Indications: prevent nausea and vomiting after surgery  Qty: 30 Tab, Refills: 0    Associated Diagnoses: Post-operative nausea and vomiting      omeprazole (PRILOSEC) 40 mg capsule Take 1 Cap by mouth daily. Indications: gastroesophageal reflux disease  Qty: 90 Cap, Refills: 0    Associated Diagnoses: Gastroesophageal reflux disease, esophagitis presence not specified         CONTINUE these medications which have NOT CHANGED    Details   LORazepam (ATIVAN) 1 mg tablet Take 1 Tab by mouth every four (4) hours as needed for Anxiety. Max Daily Amount: 6 mg. Indications: anxious  Qty: 1 Tab, Refills: 0    Associated Diagnoses: Anxiety      sertraline (ZOLOFT) 100 mg tablet Take 1 Tab by mouth daily.   Qty: 90 Tab, Refills: 3 Associated Diagnoses: Mild major depression (HCC)      pramipexole (MIRAPEX) 1.5 mg tablet Take 2 Tabs by mouth nightly. Qty: 180 Tab, Refills: 3    Associated Diagnoses: Restless leg syndrome      levothyroxine (SYNTHROID) 125 mcg tablet Take 1 Tab by mouth Daily (before breakfast). Indications: a condition with low thyroid hormone levels  Qty: 90 Tab, Refills: 3    Associated Diagnoses: Hypothyroidism, unspecified type      aspirin 81 mg chewable tablet Take 81 mg by mouth Three (3) times a week. Patient states only takes a few times a week as a  preventative only d/t family history of DVT. Last dose 1/25/20  Indications: treatment to prevent peripheral artery thromboembolism      oxyCODONE IR (ROXICODONE) 10 mg tab immediate release tablet Take 10 mg by mouth three (3) times daily. Indications: pain      oxycodone myristate (XTAMPZA ER PO) Take 23 mg by mouth every twelve (12) hours. Non-formulary medication. Patient instructed to bring medication to the hospital on the DOS, in the original bottle, and give to nurse. Indications: for pain      ibuprofen (MOTRIN) 600 mg tablet Take 600 mg by mouth three (3) times daily. Hold for procedure. Indications: pain               Disposition: To Home    Discharge Instructions/Follow-up Care:      General Instructions  No bathtub or pool for 2 weeks. Ok to shower. No weight lifting greater than 20 lbs for 6 weeks. Leave the skin glue in place. It will fall off on its own in 7-10 days. Diet  Take 30 cc (1 oz) of water or protein every 30 minutes. You should be drinking about 64 ounces of fluids a day to prevent dehydration. You should be consuming about 60-80 grams of protein a day to allow for good wound healing and healthy weight loss. Medications  Take tylenol as needed for mild pain every 4-6 hours. Percocet prescribed for mod to severe pain. This is a narcotic and can cause drowsiness, nausea and vomiting and constipation.   Take Colace 100mg BID (over the counter) if constipated. Take Protonix 40 mg daily for 3 months. Take Carafate 10 mL by mouth three (3) times daily for 30 days. Take Zofran 8 mg tablet as needed for nausea every 8 hours. Follow Up  Follow in bariatric clinic with Kody Valdes MD in 10 days. Your appointment should be already scheduled but if not, please call and make an appointment.        Signed:  Colette Ware NP   7/1/2020  8:42 AM

## 2020-07-01 NOTE — PROGRESS NOTES
END OF SHIFT NOTE:    Intake/Output  06/30 1901 - 07/01 0700  In: 1258.5 [P.O.:240; I.V.:1018.5]  Out: -    Voiding: YES  Catheter: NO  Drain:              Stool:  0 occurrences. Stool Assessment  Stool Appearance: Formed (06/30/20 0804)    Emesis:  0 occurrences. VITAL SIGNS  Patient Vitals for the past 12 hrs:   Temp Pulse Resp BP SpO2   07/01/20 0007 98.3 °F (36.8 °C) 73 19 120/40 96 %   06/30/20 2000 98.3 °F (36.8 °C) 80 15 153/81 95 %       Pain Assessment  Pain 1  Pain Scale 1: Numeric (0 - 10) (06/30/20 2030)  Pain Intensity 1: 5 (06/30/20 2030)  Patient Stated Pain Goal: 3 (06/30/20 2030)  Pain Reassessment 1: Yes (06/30/20 2030)  Pain Onset 1: post op (06/30/20 1933)  Pain Location 1: Abdomen (06/30/20 1933)  Pain Orientation 1: Mid;Left;Right (06/30/20 1933)  Pain Description 1: Aching (06/30/20 1933)  Pain Intervention(s) 1: Medication (see MAR); Distraction (06/30/20 1933)    Ambulating  Yes;w/ standby assist    Additional Information:   Post op care continued;encouraged OOB/ambulate;complied. Possible d/c    Shift report given to oncoming nurse Amrita Bynum at the bedside.     Rubén Izaguirre, RN

## 2020-07-01 NOTE — PROGRESS NOTES
Problem: Falls - Risk of  Goal: *Absence of Falls  Description: Document Corry Sebastian Fall Risk and appropriate interventions in the flowsheet.   Outcome: Resolved/Met  Note: Fall Risk Interventions:  Mobility Interventions: Communicate number of staff needed for ambulation/transfer, Patient to call before getting OOB         Medication Interventions: Teach patient to arise slowly         History of Falls Interventions: Door open when patient unattended         Problem: Patient Education: Go to Patient Education Activity  Goal: Patient/Family Education  Outcome: Resolved/Met     Problem: Patient Education: Go to Patient Education Activity  Goal: Patient/Family Education  Outcome: Resolved/Met     Problem: Pain  Goal: *Control of Pain  Outcome: Resolved/Met     Problem: Patient Education: Go to Patient Education Activity  Goal: Patient/Family Education  Outcome: Resolved/Met     Problem: Gastric Sleeve Pathway / Bariatric Revision Pathway: Post-Op Day 2  Goal: Off Pathway (Use only if patient is Off Pathway)  Outcome: Resolved/Met  Goal: Activity/Safety  Outcome: Resolved/Met  Goal: Diagnostic Test/Procedures  Outcome: Resolved/Met  Goal: Nutrition/Diet  Outcome: Resolved/Met  Goal: Discharge Planning  Outcome: Resolved/Met  Goal: Medications  Outcome: Resolved/Met  Goal: Respiratory  Outcome: Resolved/Met  Goal: Treatments/Interventions/Procedures  Outcome: Resolved/Met  Goal: Psychosocial  Outcome: Resolved/Met

## 2020-07-01 NOTE — PROGRESS NOTES
Care Management Interventions  PCP Verified by CM: Yes(Recently move to [facility] so planning to obtain PCP there)  Palliative Care Criteria Met (RRAT>21 & CHF Dx)?: No(Risk 6% Dx S/P Gastric Bypass)  Mode of Transport at Discharge: Other (see comment)(family)  Transition of Care Consult (CM Consult): Discharge Planning  Discharge Durable Medical Equipment: No(wheelchair, walker, cane)  Physical Therapy Consult: Yes  Occupational Therapy Consult: No  Speech Therapy Consult: No  Current Support Network: Lives with Spouse([place] [phone])  Confirm Follow Up Transport: Family  Discharge Location  Discharge Placement: Home with family assistance  Patient ready for d/c. She voices no concerns or needs for d/c. Patient d/c home with her spouse.

## 2020-07-01 NOTE — PROGRESS NOTES
Discharge    Patient afebrile and VSS. Given AVS. Discharge instructions reviewed. Questions answered. IV site removed. Awaiting ride.

## 2020-07-01 NOTE — PROGRESS NOTES
Uriel Duenas MD   Bariatric & Advanced Laparoscopic Surgery & Endoscopy  [facility] [facility]  [place]  Phone [phone]   Fax [phone]      Date of visit: 2020          Name: Mart Mchugh      MRN: [id number]       : 1970       Age: 51 y.o. Sex: female          Subjective:     Mart Mchugh is a 51 y.o. female s/p Procedure(s):  ERAS / LAPAROSCOPIC GASTRIC BYPASS/ HIATAL HERNIA REPAIR  ESOPHAGOGASTRODUODENOSCOPY (EGD) 2 Days Post-Op    POD 2  Nausea/Vomiting - none  HR overnight - Normal  Labs reviewed - within normal limits    Patient is awake and alert in bed this morning. She states she felt a little dehydrated, education was given on importance and frequency of drinking fluids. Rx were sent to pharmacy. VSS, AF, -N/V, +BM, Voiding, ambulating, using IS without complications. Labs are stable.      MEDS:    Current Facility-Administered Medications   Medication    simethicone (MYLICON) tablet 80 mg    oxyCODONE IR (ROXICODONE) tablet 5 mg    ketorolac (TORADOL) injection 30 mg    HYDROmorphone (PF) (DILAUDID) injection 1 mg    pramipexole (MIRAPEX) tablet 3 mg    acetaminophen (TYLENOL) tablet 1,000 mg    naloxone (NARCAN) injection 0.4 mg    fat emulsion 20% (LIPOSYN, INTRAlipid) infusion    ondansetron (ZOFRAN) injection 4 mg    heparin (porcine) injection 5,000 Units    diphenhydrAMINE (BENADRYL) injection 12.5 mg    gabapentin (NEURONTIN) capsule 200 mg    promethazine (PHENERGAN) with saline injection 12.5 mg    pantoprazole (PROTONIX) injection 40 mg    sucralfate (CARAFATE) tablet 1 g    scopolamine (TRANSDERM-SCOP) 1 mg over 3 days 1 Patch       ALLERGIES:       Allergies   Allergen Reactions    Nickel Rash    Sulfa (Sulfonamide Antibiotics) Rash       I/O:      Intake/Output Summary (Last 24 hours) at 2020 0703  Last data filed at 2020 0547  Gross per 24 hour   Intake 1698.53 ml   Output 300 ml   Net 1398.53 ml           Physical Exam:     Visit Vitals  /52 (BP 1 Location: Left arm, BP Patient Position: At rest)   Pulse 67   Temp 97.7 °F (36.5 °C)   Resp 18   Ht 5' 6\" (1.676 m)   Wt 301 lb (136.5 kg)   SpO2 99%   BMI 48.58 kg/m²       General:  Well-developed, well-nourished, no distress. Psych:  Cooperative, good insight and judgement. Neuro:  Alert, oriented to person, place and time. HEENT:  Normocephalic, atraumatic. Sclera clear. Lungs:  Unlabored breathing. Symmetrical chest expansion. Heart:  Regular rate and rhythm. No JVD. Abdomen:  Soft, non distended, appropriately ttp at incision sites. Lap incisions C/D/I. Abd binder in place. Extremities:  Extremities normal, atraumatic, no cyanosis or edema. Labs: All recent labs were reviewed. No results found for this or any previous visit (from the past 24 hour(s)). Imaging:   No results found. Assessment/Plan:  Lemuel Harrington is a 51 y.o. female s/p Procedure(s):  ERAS / LAPAROSCOPIC GASTRIC BYPASS/ HIATAL HERNIA REPAIR  ESOPHAGOGASTRODUODENOSCOPY (EGD)      Will D/C to home today with office follow up. Patient is from [facility] and will remain in hotel for next 2 days.    Pain well controlled  Lap incisions C/D/I   Tolerated diet      Oly Burrell NP    7/1/2020 7:33 AM

## 2020-07-04 LAB
COVID-19 RAPID TEST, COVR: NOT DETECTED
SARS-COV-2, COV2NT: NOT DETECTED
SOURCE, COVRS: NORMAL

## 2020-07-08 ENCOUNTER — HOSPITAL ENCOUNTER (OUTPATIENT)
Dept: CT IMAGING | Age: 50
Discharge: HOME | End: 2020-07-08
Attending: SURGERY
Payer: OTHER GOVERNMENT

## 2020-07-08 DIAGNOSIS — E66.01 MORBID OBESITY DUE TO EXCESS CALORIES (HCC): ICD-10-CM

## 2020-07-08 PROCEDURE — 74177 CT ABD & PELVIS W/CONTRAST: CPT

## 2020-07-08 PROCEDURE — 74011000258 HC RX REV CODE- 258: Performed by: SURGERY

## 2020-07-08 PROCEDURE — 74011636320 HC RX REV CODE- 636/320: Performed by: SURGERY

## 2020-07-08 RX ORDER — SODIUM CHLORIDE 0.9 % (FLUSH) 0.9 %
10 SYRINGE (ML) INJECTION
Status: COMPLETED | OUTPATIENT
Start: 2020-07-08 | End: 2020-07-08

## 2020-07-08 RX ADMIN — Medication 10 ML: at 13:48

## 2020-07-08 RX ADMIN — SODIUM CHLORIDE 100 ML: 900 INJECTION, SOLUTION INTRAVENOUS at 13:48

## 2020-07-08 RX ADMIN — IOPAMIDOL 100 ML: 755 INJECTION, SOLUTION INTRAVENOUS at 13:48

## 2020-12-10 ENCOUNTER — HOSPITAL ENCOUNTER (OUTPATIENT)
Dept: LAB | Age: 50
Discharge: HOME | End: 2020-12-10
Attending: SURGERY
Payer: COMMERCIAL

## 2020-12-10 ENCOUNTER — HOSPITAL ENCOUNTER (OUTPATIENT)
Dept: GENERAL RADIOLOGY | Age: 50
Discharge: HOME | End: 2020-12-10
Attending: SURGERY
Payer: COMMERCIAL

## 2020-12-10 DIAGNOSIS — R11.2 NAUSEA AND VOMITING, INTRACTABILITY OF VOMITING NOT SPECIFIED, UNSPECIFIED VOMITING TYPE: ICD-10-CM

## 2020-12-10 DIAGNOSIS — Z98.0 STATUS POST BYPASS GASTROJEJUNOSTOMY: ICD-10-CM

## 2020-12-10 DIAGNOSIS — Z98.84 STATUS POST BARIATRIC SURGERY: ICD-10-CM

## 2020-12-10 LAB
ALBUMIN SERPL-MCNC: 3.6 G/DL (ref 3.5–5)
ALBUMIN/GLOB SERPL: 1.1 {RATIO} (ref 1.2–3.5)
ALP SERPL-CCNC: 115 U/L (ref 50–136)
ALT SERPL-CCNC: 14 U/L (ref 12–65)
ANION GAP SERPL CALC-SCNC: 5 MMOL/L (ref 7–16)
AST SERPL-CCNC: 17 U/L (ref 15–37)
BASOPHILS # BLD: 0.1 K/UL (ref 0–0.2)
BASOPHILS NFR BLD: 1 % (ref 0–2)
BILIRUB SERPL-MCNC: 0.4 MG/DL (ref 0.2–1.1)
BUN SERPL-MCNC: 10 MG/DL (ref 6–23)
CALCIUM SERPL-MCNC: 9 MG/DL (ref 8.3–10.4)
CHLORIDE SERPL-SCNC: 104 MMOL/L (ref 98–107)
CO2 SERPL-SCNC: 29 MMOL/L (ref 21–32)
CREAT SERPL-MCNC: 0.74 MG/DL (ref 0.6–1)
DIFFERENTIAL METHOD BLD: ABNORMAL
EOSINOPHIL # BLD: 0.2 K/UL (ref 0–0.8)
EOSINOPHIL NFR BLD: 2 % (ref 0.5–7.8)
ERYTHROCYTE [DISTWIDTH] IN BLOOD BY AUTOMATED COUNT: 14.7 % (ref 11.9–14.6)
GLOBULIN SER CALC-MCNC: 3.2 G/DL (ref 2.3–3.5)
GLUCOSE SERPL-MCNC: 81 MG/DL (ref 65–100)
HCT VFR BLD AUTO: 35.4 % (ref 35.8–46.3)
HGB BLD-MCNC: 10.7 G/DL (ref 11.7–15.4)
IMM GRANULOCYTES # BLD AUTO: 0 K/UL (ref 0–0.5)
IMM GRANULOCYTES NFR BLD AUTO: 0 % (ref 0–5)
LIPASE SERPL-CCNC: 64 U/L (ref 73–393)
LYMPHOCYTES # BLD: 3.2 K/UL (ref 0.5–4.6)
LYMPHOCYTES NFR BLD: 44 % (ref 13–44)
MCH RBC QN AUTO: 25.2 PG (ref 26.1–32.9)
MCHC RBC AUTO-ENTMCNC: 30.2 G/DL (ref 31.4–35)
MCV RBC AUTO: 83.3 FL (ref 79.6–97.8)
MONOCYTES # BLD: 0.4 K/UL (ref 0.1–1.3)
MONOCYTES NFR BLD: 6 % (ref 4–12)
NEUTS SEG # BLD: 3.5 K/UL (ref 1.7–8.2)
NEUTS SEG NFR BLD: 47 % (ref 43–78)
NRBC # BLD: 0 K/UL (ref 0–0.2)
PLATELET # BLD AUTO: 307 K/UL (ref 150–450)
PMV BLD AUTO: 10.5 FL (ref 9.4–12.3)
POTASSIUM SERPL-SCNC: 4.5 MMOL/L (ref 3.5–5.1)
PROT SERPL-MCNC: 6.8 G/DL (ref 6.3–8.2)
RBC # BLD AUTO: 4.25 M/UL (ref 4.05–5.2)
SODIUM SERPL-SCNC: 138 MMOL/L (ref 136–145)
TSH SERPL DL<=0.005 MIU/L-ACNC: 4.75 UIU/ML
WBC # BLD AUTO: 7.3 K/UL (ref 4.3–11.1)

## 2020-12-10 PROCEDURE — 84443 ASSAY THYROID STIM HORMONE: CPT

## 2020-12-10 PROCEDURE — 74011000255 HC RX REV CODE- 255: Performed by: SURGERY

## 2020-12-10 PROCEDURE — 84425 ASSAY OF VITAMIN B-1: CPT

## 2020-12-10 PROCEDURE — 85025 COMPLETE CBC W/AUTO DIFF WBC: CPT

## 2020-12-10 PROCEDURE — 80053 COMPREHEN METABOLIC PANEL: CPT

## 2020-12-10 PROCEDURE — 74240 X-RAY XM UPR GI TRC 1CNTRST: CPT

## 2020-12-10 PROCEDURE — 83690 ASSAY OF LIPASE: CPT

## 2020-12-10 PROCEDURE — 36415 COLL VENOUS BLD VENIPUNCTURE: CPT

## 2020-12-10 PROCEDURE — 82306 VITAMIN D 25 HYDROXY: CPT

## 2020-12-10 PROCEDURE — 74011000636 HC RX REV CODE- 636: Performed by: SURGERY

## 2020-12-10 RX ADMIN — BARIUM SULFATE 355 ML: 0.6 SUSPENSION ORAL at 12:15

## 2020-12-10 RX ADMIN — DIATRIZOATE MEGLUMINE AND DIATRIZOATE SODIUM 30 ML: 660; 100 LIQUID ORAL; RECTAL at 12:13

## 2020-12-10 NOTE — PROGRESS NOTES
UGI normal. GI referral placed for EGD.      Kathy Townsend MD  Bariatric & Minimally Invasive Surgery  [facility]  12/10/2020 1:35 PM

## 2020-12-11 LAB — 25(OH)D3+25(OH)D2 SERPL-MCNC: 9.5 NG/ML (ref 30–100)

## 2020-12-11 NOTE — PROGRESS NOTES
She needs to increase her Vit D replacement.      Austin Muse MD  Bariatric & Minimally Invasive Surgery  [facility]  12/11/2020 8:43 AM

## 2020-12-14 LAB — VIT B1 BLD-SCNC: 76.6 NMOL/L (ref 66.5–200)

## (undated) DEVICE — DRAPE,UNDERBUTTOCKS,PCH,STERILE: Brand: MEDLINE

## (undated) DEVICE — Device

## (undated) DEVICE — BANDAGE,GAUZE,BULKEE II,4.5"X4.1YD,STRL: Brand: MEDLINE

## (undated) DEVICE — SOLUTION MNOMTR 80ML ES HI VISC FOR VISCOUS SWALLOW DONE

## (undated) DEVICE — SINGLE BASIN: Brand: CARDINAL HEALTH

## (undated) DEVICE — SYR IRR BLB 3OZ DISP GRN STRL -- MEDICHOICE

## (undated) DEVICE — AMD ANTIMICROBIAL BANDAGE ROLL,6 PLY: Brand: KERLIX

## (undated) DEVICE — SYR LR LCK 1ML GRAD NSAF 30ML --

## (undated) DEVICE — GASTRIC SLEEVE: Brand: MEDLINE INDUSTRIES, INC.

## (undated) DEVICE — PAD,ABDOMINAL,5"X9",ST,LF,25/BX: Brand: MEDLINE INDUSTRIES, INC.

## (undated) DEVICE — INSUFFLATION NEEDLE TO ESTABLISH PNEUMOPERITONEUM.: Brand: INSUFFLATION NEEDLE

## (undated) DEVICE — 2000CC GUARDIAN II: Brand: GUARDIAN

## (undated) DEVICE — SPLINT CAST W4XL30IN WHT THMB FBRGLS PRECUT INTLOK WRINKLE

## (undated) DEVICE — BUTTON SWITCH PENCIL BLADE ELECTRODE, HOLSTER: Brand: EDGE

## (undated) DEVICE — STERILE SLEEVE: Brand: CONVERTORS

## (undated) DEVICE — SYR 3ML LL TIP 1/10ML GRAD --

## (undated) DEVICE — SOLUTION IV 1000ML 0.9% SOD CHL

## (undated) DEVICE — BASIN EMESIS 500CC ROSE 250/CS 60/PLT: Brand: MEDEGEN MEDICAL PRODUCTS, LLC

## (undated) DEVICE — NEEDLE HYPO 21GA L1.5IN INTRAMUSCULAR S STL LATCH BVL UP

## (undated) DEVICE — NON-ADHERING DRESSING: Brand: ADAPTIC®

## (undated) DEVICE — ZIP 16 SURGICAL SKIN CLOSURE DEVICE: Brand: ZIP 16 SURGICAL SKIN CLOSURE DEVICE

## (undated) DEVICE — APPLICATOR ENDOSCP 40 CM W/ SNAP LCK DUPLOSPRAY MIS 0601130] BAXTER BIOSURGERY]

## (undated) DEVICE — 2, DISPOSABLE SUCTION/IRRIGATOR WITHOUT DISPOSABLE TIP: Brand: STRYKEFLOW

## (undated) DEVICE — CARDINAL HEALTH FLEXIBLE LIGHT HANDLE COVER: Brand: CARDINAL HEALTH

## (undated) DEVICE — SYRINGE CATH TIP 50ML

## (undated) DEVICE — SUTURE MCRYL SZ 4-0 L27IN ABSRB UD L19MM PS-2 1/2 CIR PRIM Y426H

## (undated) DEVICE — CUP MED 1OZ CLR POLYPR FEED GRAD W/O LID

## (undated) DEVICE — DRAPE TWL SURG 16X26IN BLU ORB04] ALLCARE INC]

## (undated) DEVICE — [HIGH FLOW INSUFFLATOR,  DO NOT USE IF PACKAGE IS DAMAGED,  KEEP DRY,  KEEP AWAY FROM SUNLIGHT,  PROTECT FROM HEAT AND RADIOACTIVE SOURCES.]: Brand: PNEUMOSURE

## (undated) DEVICE — BNDG ELAS ESMARK 4INX12FT LF -- STRL

## (undated) DEVICE — SUTURE VCRL SZ 2-0 L27IN ABSRB UD L26MM CT-2 1/2 CIR J269H

## (undated) DEVICE — PADDING CAST W4INXL4YD ST COT COHESIVE HND TEARABLE SPEC

## (undated) DEVICE — SHEAR HARMONIC 5MMX45CM -- ACE 7+

## (undated) DEVICE — SPONGE LAP 18X18IN STRL -- 5/PK

## (undated) DEVICE — RELOAD STPL L60MM H1-2.6MM MESENTERY THN TISS WHT 6 ROW

## (undated) DEVICE — DERMABOND SKIN ADH 0.7ML -- DERMABOND ADVANCED 12/BX

## (undated) DEVICE — DRAPE XR C ARM 41X74IN LF --

## (undated) DEVICE — REM POLYHESIVE ADULT PATIENT RETURN ELECTRODE: Brand: VALLEYLAB

## (undated) DEVICE — LARGE TEAR CROSS CUT RASP (14.0 X 7.0MM)

## (undated) DEVICE — (D)PREP SKN CHLRAPRP APPL 26ML -- CONVERT TO ITEM 371833

## (undated) DEVICE — STAPLER INT AD L L25MM DIA12MM INTLUMN WHT TI CIR CUT 2 ROW

## (undated) DEVICE — STAPLER INT W25MM WHT TRNSOR CIR ANVIL W/ ADVANCING PROX

## (undated) DEVICE — STAPLER SKIN L440MM 32MM LNG 12 FIRING B FRM PWR + GRIPPING

## (undated) DEVICE — ZIMMER® STERILE DISPOSABLE TOURNIQUET CUFF WITH PLC, DUAL PORT, SINGLE BLADDER, 18 IN. (46 CM)

## (undated) DEVICE — CURITY NON-ADHERENT STRIPS: Brand: CURITY

## (undated) DEVICE — FOOT & ANKLE SOFT DR WOMACK: Brand: MEDLINE INDUSTRIES, INC.

## (undated) DEVICE — SUTURING DEVICE: Brand: ENDO STITCH

## (undated) DEVICE — GOWN,REINF,POLY,ECL,PP SLV,XL: Brand: MEDLINE

## (undated) DEVICE — 3M™ TRANSPORE™ WHITE SURGICAL TAPE 1534-1, 1 INCH X 10 YARD (2,5CM X 9,1M), 12 ROLLS/CARTON 10 CARTONS/CASE: Brand: 3M™ TRANSPORE™

## (undated) DEVICE — SPONGE GZ W4XL4IN COT 12 PLY TYP VII WVN C FLD DSGN

## (undated) DEVICE — POLYESTER SINGLE STITCH RELOAD: Brand: SURGIDAC

## (undated) DEVICE — TISSUE FACE KLNX 9X8IN --

## (undated) DEVICE — SUTURE MCRYL SZ 3-0 L27IN ABSRB UD L19MM PS-2 3/8 CIR PRIM Y427H

## (undated) DEVICE — BINDER ABD M/L H12IN FOR 46-62IN WHT 4 SLD PNL DSGN HOOP

## (undated) DEVICE — DRAPE SHT 3 QTR PROXIMA 53X77 --

## (undated) DEVICE — 4-PORT MANIFOLD: Brand: NEPTUNE 2

## (undated) DEVICE — ZIMMER® STERILE DISPOSABLE TOURNIQUET CUFF WITH PLC, DUAL PORT, SINGLE BLADDER, 30 IN. (76 CM)

## (undated) DEVICE — SUTURE SZ 0 27IN 5/8 CIR UR-6  TAPER PT VIOLET ABSRB VICRYL J603H

## (undated) DEVICE — TROCAR: Brand: KII® OPTICAL ACCESS SYSTEM

## (undated) DEVICE — ZIP 8I SURGICAL SKIN CLOSURE DEVICE: Brand: ZIP 8I SURGICAL SKIN CLOSURE DEVICE

## (undated) DEVICE — DRAPE,TOP,102X53,STERILE: Brand: MEDLINE

## (undated) DEVICE — GARMENT,MEDLINE,DVT,INT,CALF,LG, GEN2: Brand: MEDLINE

## (undated) DEVICE — SUTURE MCRYL SZ 3-0 L27IN ABSRB UD L24MM PS-1 3/8 CIR PRIM Y936H

## (undated) DEVICE — DEVICE SUT VLT PRELD W/ POLYSRB 2-0 L48IN SUT DISP FOR LAP

## (undated) DEVICE — AMD ANTIMICROBIAL GAUZE SPONGES,12 PLY USP TYPE VII, 0.2% POLYHEXAMETHYLENE BIGUANIDE HCI (PHMB): Brand: CURITY

## (undated) DEVICE — RELOAD STPL H1.8-3.8MM REG THCK TISS G 6 ROW GRIPPING SURF

## (undated) DEVICE — LOGICUT SCISSOR LENGTH 450MM: Brand: LOGI - LAPAROSCOPIC INSTRUMENT SYSTEM

## (undated) DEVICE — KIT,ANTI FOG,W/SPONGE & FLUID,SOFT PACK: Brand: MEDLINE

## (undated) DEVICE — SOLUTION IRRIG 3000ML 0.9% SOD CHL FLX CONT 0797208] ICU MEDICAL INC]

## (undated) DEVICE — Z CONVERTED USE 2271148 CONNECTOR TBNG POLYPR 5IN1 TOUGH SHATTERPROOF FOR 5-11MM TB

## (undated) DEVICE — BANDAGE COBAN 6 IN WND 6INX5YD FOAM

## (undated) DEVICE — 40585 XL ADVANCED TRENDELENBURG POSITIONING KIT: Brand: 40585 XL ADVANCED TRENDELENBURG POSITIONING KIT

## (undated) DEVICE — SEALANT TISS 4ML FIBRIN PREFIL SYR PRIMA FRZN W/ 1 DUPLOJET

## (undated) DEVICE — LAPAROSCOPIC TROCAR SLEEVE/SINGLE USE: Brand: KII® OPTICAL ACCESS SYSTEM

## (undated) DEVICE — BNDG,ELSTC,MATRIX,STRL,4"X5YD,LF,HOOK&LP: Brand: MEDLINE